# Patient Record
Sex: FEMALE | Race: WHITE | NOT HISPANIC OR LATINO | Employment: UNEMPLOYED | URBAN - METROPOLITAN AREA
[De-identification: names, ages, dates, MRNs, and addresses within clinical notes are randomized per-mention and may not be internally consistent; named-entity substitution may affect disease eponyms.]

---

## 2023-04-26 ENCOUNTER — OFFICE VISIT (OUTPATIENT)
Dept: PRIMARY CARE | Facility: CLINIC | Age: 78
End: 2023-04-26
Payer: MEDICARE

## 2023-04-26 ENCOUNTER — LAB (OUTPATIENT)
Dept: LAB | Facility: LAB | Age: 78
End: 2023-04-26
Payer: MEDICARE

## 2023-04-26 VITALS
DIASTOLIC BLOOD PRESSURE: 80 MMHG | SYSTOLIC BLOOD PRESSURE: 134 MMHG | HEIGHT: 55 IN | BODY MASS INDEX: 24.3 KG/M2 | TEMPERATURE: 96.4 F | HEART RATE: 76 BPM | WEIGHT: 105 LBS

## 2023-04-26 DIAGNOSIS — I47.10 PAROXYSMAL SVT (SUPRAVENTRICULAR TACHYCARDIA) (CMS-HCC): ICD-10-CM

## 2023-04-26 DIAGNOSIS — K76.6 PORTAL HYPERTENSION (MULTI): ICD-10-CM

## 2023-04-26 DIAGNOSIS — M46.46 DISCITIS OF LUMBAR REGION: ICD-10-CM

## 2023-04-26 DIAGNOSIS — M25.551 PAIN OF RIGHT HIP: ICD-10-CM

## 2023-04-26 DIAGNOSIS — K70.30 ALCOHOLIC CIRRHOSIS OF LIVER WITHOUT ASCITES (MULTI): Primary | ICD-10-CM

## 2023-04-26 DIAGNOSIS — K70.30 ALCOHOLIC CIRRHOSIS OF LIVER WITHOUT ASCITES (MULTI): ICD-10-CM

## 2023-04-26 DIAGNOSIS — M79.651 THIGH PAIN, MUSCULOSKELETAL, RIGHT: ICD-10-CM

## 2023-04-26 PROBLEM — D69.59 THROMBOCYTOPENIA DUE TO HYPERSPLENISM: Status: ACTIVE | Noted: 2023-04-26

## 2023-04-26 PROBLEM — I85.01 ESOPHAGEAL VARICES WITH BLEEDING (MULTI): Status: ACTIVE | Noted: 2023-04-26

## 2023-04-26 PROBLEM — K74.60 CIRRHOSIS (MULTI): Status: ACTIVE | Noted: 2023-04-26

## 2023-04-26 PROBLEM — D73.1 THROMBOCYTOPENIA DUE TO HYPERSPLENISM: Status: ACTIVE | Noted: 2023-04-26

## 2023-04-26 PROBLEM — K44.9 HIATAL HERNIA: Status: ACTIVE | Noted: 2023-04-26

## 2023-04-26 PROBLEM — D62 ANEMIA DUE TO ACUTE BLOOD LOSS: Status: ACTIVE | Noted: 2023-04-26

## 2023-04-26 LAB
BASOPHILS (10*3/UL) IN BLOOD BY AUTOMATED COUNT: 0.04 X10E9/L (ref 0–0.1)
BASOPHILS/100 LEUKOCYTES IN BLOOD BY AUTOMATED COUNT: 1 % (ref 0–2)
EOSINOPHILS (10*3/UL) IN BLOOD BY AUTOMATED COUNT: 0.01 X10E9/L (ref 0–0.4)
EOSINOPHILS/100 LEUKOCYTES IN BLOOD BY AUTOMATED COUNT: 0.3 % (ref 0–6)
ERYTHROCYTE DISTRIBUTION WIDTH (RATIO) BY AUTOMATED COUNT: 16.4 % (ref 11.5–14.5)
ERYTHROCYTE MEAN CORPUSCULAR HEMOGLOBIN CONCENTRATION (G/DL) BY AUTOMATED: 28.4 G/DL (ref 32–36)
ERYTHROCYTE MEAN CORPUSCULAR VOLUME (FL) BY AUTOMATED COUNT: 91 FL (ref 80–100)
ERYTHROCYTES (10*6/UL) IN BLOOD BY AUTOMATED COUNT: 3.64 X10E12/L (ref 4–5.2)
HEMATOCRIT (%) IN BLOOD BY AUTOMATED COUNT: 33.1 % (ref 36–46)
HEMOGLOBIN (G/DL) IN BLOOD: 9.4 G/DL (ref 12–16)
IMMATURE GRANULOCYTES/100 LEUKOCYTES IN BLOOD BY AUTOMATED COUNT: 0 % (ref 0–0.9)
LEUKOCYTES (10*3/UL) IN BLOOD BY AUTOMATED COUNT: 3.9 X10E9/L (ref 4.4–11.3)
LYMPHOCYTES (10*3/UL) IN BLOOD BY AUTOMATED COUNT: 0.34 X10E9/L (ref 0.8–3)
LYMPHOCYTES/100 LEUKOCYTES IN BLOOD BY AUTOMATED COUNT: 8.7 % (ref 13–44)
MONOCYTES (10*3/UL) IN BLOOD BY AUTOMATED COUNT: 0.33 X10E9/L (ref 0.05–0.8)
MONOCYTES/100 LEUKOCYTES IN BLOOD BY AUTOMATED COUNT: 8.4 % (ref 2–10)
NEUTROPHILS (10*3/UL) IN BLOOD BY AUTOMATED COUNT: 3.19 X10E9/L (ref 1.6–5.5)
NEUTROPHILS/100 LEUKOCYTES IN BLOOD BY AUTOMATED COUNT: 81.6 % (ref 40–80)
PLATELETS (10*3/UL) IN BLOOD AUTOMATED COUNT: 65 X10E9/L (ref 150–450)

## 2023-04-26 PROCEDURE — 1160F RVW MEDS BY RX/DR IN RCRD: CPT | Performed by: INTERNAL MEDICINE

## 2023-04-26 PROCEDURE — 99214 OFFICE O/P EST MOD 30 MIN: CPT | Performed by: INTERNAL MEDICINE

## 2023-04-26 PROCEDURE — 1036F TOBACCO NON-USER: CPT | Performed by: INTERNAL MEDICINE

## 2023-04-26 PROCEDURE — 36415 COLL VENOUS BLD VENIPUNCTURE: CPT

## 2023-04-26 PROCEDURE — 85025 COMPLETE CBC W/AUTO DIFF WBC: CPT

## 2023-04-26 PROCEDURE — 1159F MED LIST DOCD IN RCRD: CPT | Performed by: INTERNAL MEDICINE

## 2023-04-26 PROCEDURE — 1157F ADVNC CARE PLAN IN RCRD: CPT | Performed by: INTERNAL MEDICINE

## 2023-04-26 RX ORDER — NADOLOL 20 MG/1
1 TABLET ORAL NIGHTLY
COMMUNITY
Start: 2023-03-07 | End: 2023-09-27 | Stop reason: SDUPTHER

## 2023-04-26 RX ORDER — TRAMADOL HYDROCHLORIDE 50 MG/1
50 TABLET ORAL
Qty: 30 TABLET | Refills: 0 | Status: SHIPPED | OUTPATIENT
Start: 2023-04-26 | End: 2023-06-02 | Stop reason: SDUPTHER

## 2023-04-26 RX ORDER — LACTULOSE 10 G/15ML
10 SOLUTION ORAL; RECTAL 2 TIMES DAILY
Qty: 946 ML | Refills: 6 | Status: SHIPPED | OUTPATIENT
Start: 2023-04-26

## 2023-04-26 RX ORDER — TRAMADOL HYDROCHLORIDE 50 MG/1
TABLET ORAL
COMMUNITY
Start: 2022-08-17 | End: 2023-04-26 | Stop reason: SDUPTHER

## 2023-04-26 RX ORDER — LACTULOSE 10 G/15ML
SOLUTION ORAL; RECTAL
COMMUNITY
End: 2023-04-26 | Stop reason: SDUPTHER

## 2023-04-26 RX ORDER — SPIRONOLACTONE 50 MG/1
1 TABLET, FILM COATED ORAL DAILY
COMMUNITY
Start: 2015-12-31 | End: 2023-11-06 | Stop reason: SDUPTHER

## 2023-04-26 RX ORDER — FAMOTIDINE 20 MG/1
1 TABLET, FILM COATED ORAL 2 TIMES DAILY
COMMUNITY
Start: 2022-08-11

## 2023-04-26 RX ORDER — FUROSEMIDE 20 MG/1
1 TABLET ORAL DAILY
COMMUNITY
End: 2024-02-06 | Stop reason: SDUPTHER

## 2023-04-26 ASSESSMENT — PATIENT HEALTH QUESTIONNAIRE - PHQ9
SUM OF ALL RESPONSES TO PHQ9 QUESTIONS 1 AND 2: 0
2. FEELING DOWN, DEPRESSED OR HOPELESS: NOT AT ALL
1. LITTLE INTEREST OR PLEASURE IN DOING THINGS: NOT AT ALL

## 2023-04-26 ASSESSMENT — ENCOUNTER SYMPTOMS
CARDIOVASCULAR NEGATIVE: 1
ARTHRALGIAS: 1
DEPRESSION: 0
LOSS OF SENSATION IN FEET: 0
NUMBNESS: 1
CONSTITUTIONAL NEGATIVE: 1
LEG PAIN: 1
GASTROINTESTINAL NEGATIVE: 1
PARESTHESIAS: 1
RESPIRATORY NEGATIVE: 1
BACK PAIN: 1
OCCASIONAL FEELINGS OF UNSTEADINESS: 0
ABDOMINAL PAIN: 0
EYES NEGATIVE: 1

## 2023-04-26 NOTE — PROGRESS NOTES
"Subjective   Patient ID: Penelope A Leydig is a 77 y.o. female who presents for Follow-up (3 mo fu still having pain in rt leg also having knee pain).    Back Pain  This is a recurrent problem. The current episode started 1 to 4 weeks ago. The problem occurs constantly. The problem is unchanged. The pain is present in the lumbar spine and sacro-iliac. The quality of the pain is described as aching. The pain radiates to the right thigh. The pain is at a severity of 5/10. The pain is moderate. The pain is Worse during the day. Associated symptoms include leg pain, numbness and paresthesias. Pertinent negatives include no abdominal pain. The treatment provided mild relief.    Liver Dysfunction: Patient complains of an abnormal liver function tests within normal limits that has been associated with no obvious evidence for illness. The problem was first noted  several years ago. A positive history was noted for:  ETOH ,  cirrhosis , and exposure to  none . Patient denies none,  cirrhosis , or exposure to  ETOH .      Review of Systems   Constitutional: Negative.    HENT: Negative.     Eyes: Negative.    Respiratory: Negative.     Cardiovascular: Negative.    Gastrointestinal: Negative.  Negative for abdominal pain.   Musculoskeletal:  Positive for arthralgias and back pain.   Skin: Negative.    Neurological:  Positive for numbness and paresthesias.       Objective   /80 (BP Location: Left arm, Patient Position: Sitting, BP Cuff Size: Adult)   Pulse 76   Temp 35.8 °C (96.4 °F) (Temporal)   Ht 1.346 m (4' 5\")   Wt 47.6 kg (105 lb)   BMI 26.28 kg/m²     Physical Exam  Vitals reviewed.   Constitutional:       Appearance: Normal appearance. She is normal weight.   HENT:      Head: Normocephalic.   Eyes:      Conjunctiva/sclera: Conjunctivae normal.   Cardiovascular:      Rate and Rhythm: Normal rate and regular rhythm.   Pulmonary:      Effort: Pulmonary effort is normal.      Breath sounds: Normal breath sounds. "   Abdominal:      General: There is no distension.      Palpations: Abdomen is soft.   Musculoskeletal:         General: Tenderness and deformity present.      Cervical back: Normal range of motion.      Right lower leg: Edema present.      Left lower leg: Edema present.   Skin:     General: Skin is warm and dry.   Neurological:      General: No focal deficit present.   Psychiatric:         Mood and Affect: Mood normal.       Assessment/Plan   Problem List Items Addressed This Visit          Circulatory    Paroxysmal SVT (supraventricular tachycardia) (CMS/HCC)    Relevant Medications    nadolol (Corgard) 20 mg tablet    Portal hypertension (CMS/HCC)       Digestive    Cirrhosis (CMS/HCC) - Primary       Musculoskeletal    Pain of right hip    Thigh pain, musculoskeletal, right    Discitis of lumbar region   OARRS reviewed, UDS has been done on annual basis. Rx patten and usage reviewed, adverse reactions to controlled substance and risk of habituality and addiction and overdose and death explained, pt has been explained about [facility] controlled substance policy and mandates and follow ups. Controlled substance agreement has been reviewed and renewed annually. Please keep bottles containing controlled substance medications away from children and in safe area, do not share this Rx to anyone, noncompliance will lead to cessation of therapy. So far patient has been doing well and shows compliance.  Today patient was accompanied by her daughter, she has been having persistent pain and discomfort in the right hip, right femur or thigh and right knee region, seen by orthopedics, she has a previous subtrochanteric fracture with hardware, x-ray was reported that there could be a nonunion, I am concerned that this pain and discomfort is not going anywhere so would consider advanced imaging to see whether there is any delayed healing, there is a fracture residual, also on top of that she has a discitis and  osteomyelitis of lumbar spine, she has a pain and discomfort in the lower back region, we will do CT scan of lumbar spine to assess the discitis possibility recurrence.  She is immunocompromise, she has a cirrhosis of liver, recent hepatology follow-up was reviewed, she needs a hemoglobin, she has a chronic thrombocytopenia, she requires a rollator for mobility and ambulation, all the reports were reviewed, plan of care was reviewed with daughter, continue same medications, tramadol has been used infrequently but it has been given and narcotic contract was renewed.

## 2023-05-17 DIAGNOSIS — Q43.8: ICD-10-CM

## 2023-05-17 DIAGNOSIS — S72.001K CLOSED FRACTURE OF RIGHT HIP WITH NONUNION, SUBSEQUENT ENCOUNTER: ICD-10-CM

## 2023-05-17 DIAGNOSIS — Q87.89: ICD-10-CM

## 2023-05-17 DIAGNOSIS — K40.90 UNILATERAL INGUINAL HERNIA WITHOUT OBSTRUCTION OR GANGRENE, RECURRENCE NOT SPECIFIED: Primary | ICD-10-CM

## 2023-05-17 DIAGNOSIS — Q89.01: ICD-10-CM

## 2023-05-17 DIAGNOSIS — Z00.00 NO ABNORMALITY OF HIP JOINT DETECTED ON EXAMINATION: ICD-10-CM

## 2023-05-17 DIAGNOSIS — Q79.0: ICD-10-CM

## 2023-05-17 DIAGNOSIS — K70.30 ALCOHOLIC CIRRHOSIS OF LIVER WITHOUT ASCITES (MULTI): ICD-10-CM

## 2023-05-30 ENCOUNTER — TELEPHONE (OUTPATIENT)
Dept: PRIMARY CARE | Facility: CLINIC | Age: 78
End: 2023-05-30
Payer: MEDICARE

## 2023-05-30 NOTE — TELEPHONE ENCOUNTER
Willow would like to speak with you regarding pennys condition, please call her when you can she ask. ty

## 2023-05-31 DIAGNOSIS — K76.6 PORTAL HYPERTENSION (MULTI): ICD-10-CM

## 2023-06-02 DIAGNOSIS — M46.46 DISCITIS OF LUMBAR REGION: ICD-10-CM

## 2023-06-02 DIAGNOSIS — M79.651 THIGH PAIN, MUSCULOSKELETAL, RIGHT: ICD-10-CM

## 2023-06-02 DIAGNOSIS — M25.551 PAIN OF RIGHT HIP: ICD-10-CM

## 2023-06-02 RX ORDER — TRAMADOL HYDROCHLORIDE 50 MG/1
50 TABLET ORAL
Qty: 30 TABLET | Refills: 0 | Status: SHIPPED | OUTPATIENT
Start: 2023-06-02 | End: 2023-07-02

## 2023-07-19 DIAGNOSIS — M79.651 THIGH PAIN, MUSCULOSKELETAL, RIGHT: ICD-10-CM

## 2023-07-19 DIAGNOSIS — M25.551 PAIN OF RIGHT HIP: ICD-10-CM

## 2023-07-19 RX ORDER — TRAMADOL HYDROCHLORIDE 50 MG/1
50 TABLET ORAL DAILY PRN
Qty: 30 TABLET | Refills: 0 | Status: SHIPPED | OUTPATIENT
Start: 2023-07-19 | End: 2023-08-21 | Stop reason: SDUPTHER

## 2023-07-19 RX ORDER — TRAMADOL HYDROCHLORIDE 50 MG/1
50 TABLET ORAL DAILY PRN
COMMUNITY
Start: 2022-08-17 | End: 2023-07-19 | Stop reason: SDUPTHER

## 2023-07-26 ENCOUNTER — OFFICE VISIT (OUTPATIENT)
Dept: PRIMARY CARE | Facility: CLINIC | Age: 78
End: 2023-07-26
Payer: MEDICARE

## 2023-07-26 ENCOUNTER — LAB (OUTPATIENT)
Dept: LAB | Facility: LAB | Age: 78
End: 2023-07-26
Payer: MEDICARE

## 2023-07-26 VITALS
BODY MASS INDEX: 22.68 KG/M2 | SYSTOLIC BLOOD PRESSURE: 125 MMHG | DIASTOLIC BLOOD PRESSURE: 69 MMHG | HEART RATE: 90 BPM | HEIGHT: 55 IN | WEIGHT: 98 LBS | TEMPERATURE: 97.7 F

## 2023-07-26 DIAGNOSIS — K40.20 BILATERAL INGUINAL HERNIA WITHOUT OBSTRUCTION OR GANGRENE, RECURRENCE NOT SPECIFIED: ICD-10-CM

## 2023-07-26 DIAGNOSIS — I47.10 PAROXYSMAL SVT (SUPRAVENTRICULAR TACHYCARDIA) (CMS-HCC): Primary | ICD-10-CM

## 2023-07-26 DIAGNOSIS — K44.9 HIATAL HERNIA: ICD-10-CM

## 2023-07-26 DIAGNOSIS — D73.1 THROMBOCYTOPENIA DUE TO HYPERSPLENISM: ICD-10-CM

## 2023-07-26 DIAGNOSIS — D69.59 THROMBOCYTOPENIA DUE TO HYPERSPLENISM: ICD-10-CM

## 2023-07-26 DIAGNOSIS — K70.30 ALCOHOLIC CIRRHOSIS OF LIVER WITHOUT ASCITES (MULTI): ICD-10-CM

## 2023-07-26 DIAGNOSIS — K76.6 PORTAL HYPERTENSION (MULTI): ICD-10-CM

## 2023-07-26 DIAGNOSIS — S72.001K: ICD-10-CM

## 2023-07-26 LAB
BASOPHILS (10*3/UL) IN BLOOD BY AUTOMATED COUNT: 0.03 X10E9/L (ref 0–0.1)
BASOPHILS/100 LEUKOCYTES IN BLOOD BY AUTOMATED COUNT: 0.8 % (ref 0–2)
EOSINOPHILS (10*3/UL) IN BLOOD BY AUTOMATED COUNT: 0.04 X10E9/L (ref 0–0.4)
EOSINOPHILS/100 LEUKOCYTES IN BLOOD BY AUTOMATED COUNT: 1 % (ref 0–6)
ERYTHROCYTE DISTRIBUTION WIDTH (RATIO) BY AUTOMATED COUNT: 16.4 % (ref 11.5–14.5)
ERYTHROCYTE MEAN CORPUSCULAR HEMOGLOBIN CONCENTRATION (G/DL) BY AUTOMATED: 30.9 G/DL (ref 32–36)
ERYTHROCYTE MEAN CORPUSCULAR VOLUME (FL) BY AUTOMATED COUNT: 94 FL (ref 80–100)
ERYTHROCYTES (10*6/UL) IN BLOOD BY AUTOMATED COUNT: 3.62 X10E12/L (ref 4–5.2)
HEMATOCRIT (%) IN BLOOD BY AUTOMATED COUNT: 34 % (ref 36–46)
HEMOGLOBIN (G/DL) IN BLOOD: 10.5 G/DL (ref 12–16)
IMMATURE GRANULOCYTES/100 LEUKOCYTES IN BLOOD BY AUTOMATED COUNT: 0 % (ref 0–0.9)
LEUKOCYTES (10*3/UL) IN BLOOD BY AUTOMATED COUNT: 4 X10E9/L (ref 4.4–11.3)
LYMPHOCYTES (10*3/UL) IN BLOOD BY AUTOMATED COUNT: 0.33 X10E9/L (ref 0.8–3)
LYMPHOCYTES/100 LEUKOCYTES IN BLOOD BY AUTOMATED COUNT: 8.3 % (ref 13–44)
MONOCYTES (10*3/UL) IN BLOOD BY AUTOMATED COUNT: 0.42 X10E9/L (ref 0.05–0.8)
MONOCYTES/100 LEUKOCYTES IN BLOOD BY AUTOMATED COUNT: 10.6 % (ref 2–10)
NEUTROPHILS (10*3/UL) IN BLOOD BY AUTOMATED COUNT: 3.14 X10E9/L (ref 1.6–5.5)
NEUTROPHILS/100 LEUKOCYTES IN BLOOD BY AUTOMATED COUNT: 79.3 % (ref 40–80)
PLATELETS (10*3/UL) IN BLOOD AUTOMATED COUNT: 66 X10E9/L (ref 150–450)

## 2023-07-26 PROCEDURE — 36415 COLL VENOUS BLD VENIPUNCTURE: CPT

## 2023-07-26 PROCEDURE — 1160F RVW MEDS BY RX/DR IN RCRD: CPT | Performed by: INTERNAL MEDICINE

## 2023-07-26 PROCEDURE — 1159F MED LIST DOCD IN RCRD: CPT | Performed by: INTERNAL MEDICINE

## 2023-07-26 PROCEDURE — 1036F TOBACCO NON-USER: CPT | Performed by: INTERNAL MEDICINE

## 2023-07-26 PROCEDURE — 85025 COMPLETE CBC W/AUTO DIFF WBC: CPT

## 2023-07-26 PROCEDURE — 1157F ADVNC CARE PLAN IN RCRD: CPT | Performed by: INTERNAL MEDICINE

## 2023-07-26 PROCEDURE — 99213 OFFICE O/P EST LOW 20 MIN: CPT | Performed by: INTERNAL MEDICINE

## 2023-07-26 RX ORDER — PROPRANOLOL HYDROCHLORIDE 20 MG/1
20 TABLET ORAL DAILY PRN
COMMUNITY
End: 2023-10-25 | Stop reason: ALTCHOICE

## 2023-07-26 ASSESSMENT — ENCOUNTER SYMPTOMS
GASTROINTESTINAL NEGATIVE: 1
ABDOMINAL PAIN: 0
UNEXPECTED WEIGHT CHANGE: 0
BACK PAIN: 1
WEIGHT LOSS: 1
ARTHRALGIAS: 1
NEUROLOGICAL NEGATIVE: 1
RESPIRATORY NEGATIVE: 1
EYES NEGATIVE: 1
LIGHT-HEADEDNESS: 0
PALPITATIONS: 0
CARDIOVASCULAR NEGATIVE: 1
ANOREXIA: 0
BRUISES/BLEEDS EASILY: 1

## 2023-08-21 DIAGNOSIS — M79.651 THIGH PAIN, MUSCULOSKELETAL, RIGHT: ICD-10-CM

## 2023-08-21 DIAGNOSIS — M25.551 PAIN OF RIGHT HIP: ICD-10-CM

## 2023-08-21 RX ORDER — TRAMADOL HYDROCHLORIDE 50 MG/1
50 TABLET ORAL DAILY PRN
Qty: 30 TABLET | Refills: 0 | Status: SHIPPED | OUTPATIENT
Start: 2023-08-21 | End: 2023-09-27 | Stop reason: SDUPTHER

## 2023-09-27 DIAGNOSIS — M79.651 THIGH PAIN, MUSCULOSKELETAL, RIGHT: ICD-10-CM

## 2023-09-27 DIAGNOSIS — K70.30 ALCOHOLIC CIRRHOSIS OF LIVER WITHOUT ASCITES (MULTI): Primary | ICD-10-CM

## 2023-09-27 DIAGNOSIS — M25.551 PAIN OF RIGHT HIP: ICD-10-CM

## 2023-09-27 DIAGNOSIS — K70.30 ALCOHOLIC CIRRHOSIS OF LIVER WITHOUT ASCITES (MULTI): ICD-10-CM

## 2023-09-27 RX ORDER — NADOLOL 20 MG/1
20 TABLET ORAL NIGHTLY
Qty: 30 TABLET | Refills: 11 | Status: SHIPPED | OUTPATIENT
Start: 2023-09-27 | End: 2023-09-27 | Stop reason: SDUPTHER

## 2023-09-27 RX ORDER — NADOLOL 20 MG/1
20 TABLET ORAL NIGHTLY
Qty: 90 TABLET | Refills: 3 | Status: SHIPPED | OUTPATIENT
Start: 2023-09-27 | End: 2023-11-08 | Stop reason: ALTCHOICE

## 2023-09-27 RX ORDER — TRAMADOL HYDROCHLORIDE 50 MG/1
50 TABLET ORAL DAILY PRN
Qty: 30 TABLET | Refills: 1 | Status: SHIPPED | OUTPATIENT
Start: 2023-09-27 | End: 2023-10-19 | Stop reason: SDUPTHER

## 2023-10-19 DIAGNOSIS — M25.551 PAIN OF RIGHT HIP: ICD-10-CM

## 2023-10-19 DIAGNOSIS — M79.651 THIGH PAIN, MUSCULOSKELETAL, RIGHT: ICD-10-CM

## 2023-10-19 RX ORDER — TRAMADOL HYDROCHLORIDE 50 MG/1
50 TABLET ORAL DAILY PRN
Qty: 30 TABLET | Refills: 1 | Status: SHIPPED | OUTPATIENT
Start: 2023-10-19 | End: 2023-11-21 | Stop reason: SDUPTHER

## 2023-10-24 ENCOUNTER — HOSPITAL ENCOUNTER (EMERGENCY)
Facility: HOSPITAL | Age: 78
Discharge: HOME | End: 2023-10-25
Attending: EMERGENCY MEDICINE
Payer: MEDICARE

## 2023-10-24 ENCOUNTER — APPOINTMENT (OUTPATIENT)
Dept: RADIOLOGY | Facility: HOSPITAL | Age: 78
End: 2023-10-24
Payer: MEDICARE

## 2023-10-24 DIAGNOSIS — S01.01XA SCALP LACERATION, INITIAL ENCOUNTER: ICD-10-CM
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
## 2023-10-24 PROCEDURE — 70450 CT HEAD/BRAIN W/O DYE: CPT | Performed by: RADIOLOGY

## 2023-10-24 PROCEDURE — 99284 EMERGENCY DEPT VISIT MOD MDM: CPT | Mod: 25 | Performed by: EMERGENCY MEDICINE

## 2023-10-24 PROCEDURE — 70450 CT HEAD/BRAIN W/O DYE: CPT | Mod: MG

## 2023-10-24 ASSESSMENT — LIFESTYLE VARIABLES
EVER HAD A DRINK FIRST THING IN THE MORNING TO STEADY YOUR NERVES TO GET RID OF A HANGOVER: NO
HAVE PEOPLE ANNOYED YOU BY CRITICIZING YOUR DRINKING: NO
EVER FELT BAD OR GUILTY ABOUT YOUR DRINKING: NO
HAVE YOU EVER FELT YOU SHOULD CUT DOWN ON YOUR DRINKING: NO
REASON UNABLE TO ASSESS: NO

## 2023-10-24 ASSESSMENT — COLUMBIA-SUICIDE SEVERITY RATING SCALE - C-SSRS
6. HAVE YOU EVER DONE ANYTHING, STARTED TO DO ANYTHING, OR PREPARED TO DO ANYTHING TO END YOUR LIFE?: NO
1. IN THE PAST MONTH, HAVE YOU WISHED YOU WERE DEAD OR WISHED YOU COULD GO TO SLEEP AND NOT WAKE UP?: NO
1. IN THE PAST MONTH, HAVE YOU WISHED YOU WERE DEAD OR WISHED YOU COULD GO TO SLEEP AND NOT WAKE UP?: NO
6. HAVE YOU EVER DONE ANYTHING, STARTED TO DO ANYTHING, OR PREPARED TO DO ANYTHING TO END YOUR LIFE?: NO
2. HAVE YOU ACTUALLY HAD ANY THOUGHTS OF KILLING YOURSELF?: NO
2. HAVE YOU ACTUALLY HAD ANY THOUGHTS OF KILLING YOURSELF?: NO

## 2023-10-24 ASSESSMENT — PAIN SCALES - GENERAL: PAINLEVEL_OUTOF10: 10 - WORST POSSIBLE PAIN

## 2023-10-24 ASSESSMENT — PAIN - FUNCTIONAL ASSESSMENT: PAIN_FUNCTIONAL_ASSESSMENT: 0-10

## 2023-10-25 ENCOUNTER — OFFICE VISIT (OUTPATIENT)
Dept: PRIMARY CARE | Facility: CLINIC | Age: 78
End: 2023-10-25
Payer: MEDICARE

## 2023-10-25 ENCOUNTER — LAB (OUTPATIENT)
Dept: LAB | Facility: LAB | Age: 78
End: 2023-10-25
Payer: MEDICARE

## 2023-10-25 VITALS
HEART RATE: 65 BPM | RESPIRATION RATE: 16 BRPM | DIASTOLIC BLOOD PRESSURE: 61 MMHG | SYSTOLIC BLOOD PRESSURE: 134 MMHG | OXYGEN SATURATION: 97 %

## 2023-10-25 VITALS
SYSTOLIC BLOOD PRESSURE: 126 MMHG | HEIGHT: 55 IN | WEIGHT: 98 LBS | DIASTOLIC BLOOD PRESSURE: 68 MMHG | TEMPERATURE: 97.1 F | HEART RATE: 62 BPM | BODY MASS INDEX: 22.68 KG/M2

## 2023-10-25 DIAGNOSIS — Q87.89: ICD-10-CM

## 2023-10-25 DIAGNOSIS — Q89.01: ICD-10-CM

## 2023-10-25 DIAGNOSIS — D73.1 THROMBOCYTOPENIA DUE TO HYPERSPLENISM: ICD-10-CM

## 2023-10-25 DIAGNOSIS — D69.59 THROMBOCYTOPENIA DUE TO HYPERSPLENISM: ICD-10-CM

## 2023-10-25 DIAGNOSIS — Q43.8: ICD-10-CM

## 2023-10-25 DIAGNOSIS — K44.9 HIATAL HERNIA: ICD-10-CM

## 2023-10-25 DIAGNOSIS — Q79.0: ICD-10-CM

## 2023-10-25 DIAGNOSIS — K70.30 ALCOHOLIC CIRRHOSIS OF LIVER WITHOUT ASCITES (MULTI): ICD-10-CM

## 2023-10-25 DIAGNOSIS — S09.90XD CLOSED HEAD INJURY, SUBSEQUENT ENCOUNTER: ICD-10-CM

## 2023-10-25 DIAGNOSIS — I47.10 PAROXYSMAL SVT (SUPRAVENTRICULAR TACHYCARDIA) (CMS-HCC): ICD-10-CM

## 2023-10-25 DIAGNOSIS — Z00.00 NO ABNORMALITY OF HIP JOINT DETECTED ON EXAMINATION: ICD-10-CM

## 2023-10-25 DIAGNOSIS — M05.761 RHEUMATOID ARTHRITIS INVOLVING RIGHT KNEE WITH POSITIVE RHEUMATOID FACTOR (MULTI): Primary | ICD-10-CM

## 2023-10-25 LAB
ALBUMIN SERPL BCP-MCNC: 3.6 G/DL (ref 3.4–5)
ALP SERPL-CCNC: 88 U/L (ref 33–136)
ALT SERPL W P-5'-P-CCNC: 10 U/L (ref 7–45)
ANION GAP SERPL CALC-SCNC: 12 MMOL/L (ref 10–20)
AST SERPL W P-5'-P-CCNC: 22 U/L (ref 9–39)
BASOPHILS # BLD AUTO: 0.05 X10*3/UL (ref 0–0.1)
BASOPHILS NFR BLD AUTO: 0.7 %
BILIRUB SERPL-MCNC: 2 MG/DL (ref 0–1.2)
BUN SERPL-MCNC: 21 MG/DL (ref 6–23)
CALCIUM SERPL-MCNC: 9.2 MG/DL (ref 8.6–10.3)
CHLORIDE SERPL-SCNC: 103 MMOL/L (ref 98–107)
CO2 SERPL-SCNC: 26 MMOL/L (ref 21–32)
CREAT SERPL-MCNC: 0.7 MG/DL (ref 0.5–1.05)
EOSINOPHIL # BLD AUTO: 0.04 X10*3/UL (ref 0–0.4)
EOSINOPHIL NFR BLD AUTO: 0.6 %
ERYTHROCYTE [DISTWIDTH] IN BLOOD BY AUTOMATED COUNT: 16.5 % (ref 11.5–14.5)
GFR SERPL CREATININE-BSD FRML MDRD: 89 ML/MIN/1.73M*2
GLUCOSE SERPL-MCNC: 90 MG/DL (ref 74–99)
HCT VFR BLD AUTO: 35.7 % (ref 36–46)
HGB BLD-MCNC: 11.4 G/DL (ref 12–16)
IMM GRANULOCYTES # BLD AUTO: 0.02 X10*3/UL (ref 0–0.5)
IMM GRANULOCYTES NFR BLD AUTO: 0.3 % (ref 0–0.9)
LYMPHOCYTES # BLD AUTO: 0.6 X10*3/UL (ref 0.8–3)
LYMPHOCYTES NFR BLD AUTO: 8.9 %
MCH RBC QN AUTO: 31.2 PG (ref 26–34)
MCHC RBC AUTO-ENTMCNC: 31.9 G/DL (ref 32–36)
MCV RBC AUTO: 98 FL (ref 80–100)
MONOCYTES # BLD AUTO: 0.83 X10*3/UL (ref 0.05–0.8)
MONOCYTES NFR BLD AUTO: 12.3 %
NEUTROPHILS # BLD AUTO: 5.22 X10*3/UL (ref 1.6–5.5)
NEUTROPHILS NFR BLD AUTO: 77.2 %
NRBC BLD-RTO: 0 /100 WBCS (ref 0–0)
PLATELET # BLD AUTO: 124 X10*3/UL (ref 150–450)
PMV BLD AUTO: 10 FL (ref 7.5–11.5)
POTASSIUM SERPL-SCNC: 4.8 MMOL/L (ref 3.5–5.3)
PROT SERPL-MCNC: 6.2 G/DL (ref 6.4–8.2)
RBC # BLD AUTO: 3.65 X10*6/UL (ref 4–5.2)
SODIUM SERPL-SCNC: 136 MMOL/L (ref 136–145)
WBC # BLD AUTO: 6.8 X10*3/UL (ref 4.4–11.3)

## 2023-10-25 PROCEDURE — 99213 OFFICE O/P EST LOW 20 MIN: CPT | Performed by: INTERNAL MEDICINE

## 2023-10-25 PROCEDURE — 90715 TDAP VACCINE 7 YRS/> IM: CPT

## 2023-10-25 PROCEDURE — 90471 IMMUNIZATION ADMIN: CPT

## 2023-10-25 PROCEDURE — 1036F TOBACCO NON-USER: CPT | Performed by: INTERNAL MEDICINE

## 2023-10-25 PROCEDURE — 1159F MED LIST DOCD IN RCRD: CPT | Performed by: INTERNAL MEDICINE

## 2023-10-25 PROCEDURE — 12001 RPR S/N/AX/GEN/TRNK 2.5CM/<: CPT

## 2023-10-25 PROCEDURE — 36415 COLL VENOUS BLD VENIPUNCTURE: CPT

## 2023-10-25 PROCEDURE — 80053 COMPREHEN METABOLIC PANEL: CPT

## 2023-10-25 PROCEDURE — 1125F AMNT PAIN NOTED PAIN PRSNT: CPT | Performed by: INTERNAL MEDICINE

## 2023-10-25 PROCEDURE — 82105 ALPHA-FETOPROTEIN SERUM: CPT

## 2023-10-25 PROCEDURE — 85025 COMPLETE CBC W/AUTO DIFF WBC: CPT

## 2023-10-25 PROCEDURE — 2500000004 HC RX 250 GENERAL PHARMACY W/ HCPCS (ALT 636 FOR OP/ED)

## 2023-10-25 PROCEDURE — 1160F RVW MEDS BY RX/DR IN RCRD: CPT | Performed by: INTERNAL MEDICINE

## 2023-10-25 RX ADMIN — TETANUS TOXOID, REDUCED DIPHTHERIA TOXOID AND ACELLULAR PERTUSSIS VACCINE, ADSORBED 0.5 ML: 5; 2.5; 8; 8; 2.5 SUSPENSION INTRAMUSCULAR at 00:54

## 2023-10-25 ASSESSMENT — ENCOUNTER SYMPTOMS
DYSURIA: 0
CARDIOVASCULAR NEGATIVE: 1
BRUISES/BLEEDS EASILY: 1
DISORIENTATION: 0
STIFFNESS: 1
DIARRHEA: 0
DIZZINESS: 0
EYES NEGATIVE: 1
VOMITING: 0
HEADACHES: 0
GASTROINTESTINAL NEGATIVE: 1
BLURRED VISION: 0
CONSTITUTIONAL NEGATIVE: 1
RESPIRATORY NEGATIVE: 1
JOINT SWELLING: 1

## 2023-10-25 NOTE — DISCHARGE INSTRUCTIONS
Please visit closest urgent care, ED, or wound clinic to have staples removed in 10 to 14 days.  Return to closest ED if you develop any severe headache, changes in vision, fever, chills, chest pain, shortness of breath, weakness.

## 2023-10-25 NOTE — PROGRESS NOTES
Subjective   Patient ID: Penelope A Leydig is a 78 y.o. female who presents for Follow-up (3 mo fu and hosp fu).    Head Injury   The incident occurred 6 to 12 hours ago. There was no loss of consciousness. The pain is at a severity of 0/10. The patient is experiencing no pain. Pertinent negatives include no blurred vision, disorientation, headaches or vomiting. The treatment provided significant relief.   Arthritis  Presents for follow-up visit. She complains of pain, stiffness and joint swelling. The symptoms have been stable. Affected locations include the left PIP, right DIP, right PIP, left MCP, right wrist and left DIP. Pertinent negatives include no diarrhea, dry eyes, dry mouth or dysuria.      Anemia  Presents for follow-up visit. Symptoms include bruises/bleeds easily and weight loss. There has been no abdominal pain, anorexia, light-headedness or palpitations. Signs of blood loss that are not present include melena. There are no compliance problems.  Side effects of medications include fatigue.    Liver Dysfunction: Patient complains of an abnormal liver function tests within normal limits that has been associated with no obvious evidence for illness. The problem was first noted  7  YEARS ago. A positive history was noted for: CIRRHOSIS, etoh, and exposure to ethanol. Patient denies none, etoh, or exposure to ethanol.      Review of Systems   Constitutional: Negative.    HENT: Negative.     Eyes: Negative.  Negative for blurred vision.   Respiratory: Negative.     Cardiovascular: Negative.    Gastrointestinal: Negative.  Negative for diarrhea and vomiting.   Genitourinary:  Negative for dysuria.   Musculoskeletal:  Positive for arthritis, gait problem, joint swelling and stiffness.   Skin: Negative.    Neurological:  Negative for dizziness and headaches.   Hematological:  Bruises/bleeds easily.       Objective   /68 (BP Location: Left arm, Patient Position: Sitting, BP Cuff Size: Adult)   Pulse 62   " Temp 36.2 °C (97.1 °F) (Temporal)   Ht 1.321 m (4' 4\")   Wt (!) 44.5 kg (98 lb)   BMI 25.48 kg/m²     Physical Exam  Vitals reviewed.   Constitutional:       Appearance: She is normal weight.   HENT:      Head: Normocephalic. Trauma yesterday with staples  Eyes:      Conjunctiva/sclera: Conjunctivae normal.   Cardiovascular:      Rate and Rhythm: Normal rate and regular rhythm.      Pulses: Normal pulses.   Pulmonary:      Effort: Pulmonary effort is normal.      Breath sounds: Normal breath sounds.   Abdominal:      Palpations: Abdomen is soft.      Hernia: A hernia is present.   Musculoskeletal:         General: Swelling and tenderness present.      Cervical back: Normal range of motion.      Right lower leg: Edema present.      Left lower leg: Edema present.   Skin:     General: Skin is warm and dry.   Neurological:      General: No focal deficit present.   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) - Primary M05.761     Other Visit Diagnoses         Codes    Closed head injury, subsequent encounter     S09.90XD        Yesterday she fell down and she has head injury, 7 staples were taken, CT was negative, she has a thrombocytopenia, today she was accompanied by her  and her daughter, she is wheelchair-bound, she is a tiny thin female patient with advanced deformed rheumatoid arthritis not treated with any specific therapy and cirrhosis of liver, last ultrasound showed a cirrhotic morphology of the liver, there is no evidence of hepatocellular carcinoma, as far as close head injury is concerned she needs to have observation or watchful for her symptoms of lethargy, nausea, vomiting, headache because slow and progressive " subdural collection of blood can happen because of number cytopenia and family was made aware, she is dragging her right lower extremity because of nonhealed fracture of right femur, she will require hemoglobin and platelet count assessment and alpha-fetoprotein today.  It is alcoholic cirrhosis but she has not been drinking for more than 5 years, she has a huge hiatus hernia it has been left alone, she remains on tramadol treatment it is a once a day as needed, today I did not do HPI format, tramadol prescription has been given by me, family is aware about tramadol usage, risks of therapy were reviewed diuretics to be continued, beta-blockers to be continued, no SVT, follow-up in 3 months.

## 2023-10-25 NOTE — ED PROVIDER NOTES
HPI   Chief Complaint   Patient presents with    Fall    Laceration     To top of head after mechanical fall, no LOC       Patient is a 78-year-old female presenting to [facility] ED for mechanical fall and head injury.  Patient family reports that earlier today that she tripped and struck her head against a piece of wooden furniture.  Patient denies any loss of consciousness or use of blood thinners.  Patient denies any acute chest pain, shortness of breath, nausea, vomiting, diarrhea, acute vision change.                          Genie Coma Scale Score: 15                  Patient History   History reviewed. No pertinent past medical history.  Past Surgical History:   Procedure Laterality Date    CT GUIDED PERCUTANEOUS BIOPSY BONE DEEP  2022    CT GUIDED PERCUTANEOUS BIOPSY BONE DEEP 2022 [facility]    CT GUIDED PERCUTANEOUS BIOPSY BONE DEEP  2022    CT GUIDED PERCUTANEOUS BIOPSY BONE DEEP 2022 [facility] CLINICAL LEGAC[facility]    OTHER SURGICAL HISTORY  2019    Hip fracture repair     No family history on file.  Social History     Tobacco Use    Smoking status: Former     Types: Cigarettes     Quit date: 1980     Years since quittin.8    Smokeless tobacco: Never   Vaping Use    Vaping Use: Never used   Substance Use Topics    Alcohol use: Not Currently    Drug use: Never       Physical Exam   ED Triage Vitals [10/24/23 2329]   Temp Heart Rate Resp BP   -- 67 18 137/62      SpO2 Temp src Heart Rate Source Patient Position   98 % -- -- --      BP Location FiO2 (%)     -- --       Physical Exam  Constitutional:       Appearance: Normal appearance. She is normal weight.   HENT:      Head: Normocephalic.      Comments: 2 cm curvilinear laceration to the central superior scalp.     Nose: Nose normal.      Mouth/Throat:      Mouth: Mucous membranes are moist.      Pharynx: Oropharynx is clear.   Eyes:      Extraocular Movements: Extraocular movements intact.      Conjunctiva/sclera:  Conjunctivae normal.      Pupils: Pupils are equal, round, and reactive to light.   Cardiovascular:      Rate and Rhythm: Normal rate and regular rhythm.      Pulses: Normal pulses.      Heart sounds: Normal heart sounds.   Pulmonary:      Effort: Pulmonary effort is normal.      Breath sounds: Normal breath sounds.   Abdominal:      General: Abdomen is flat. Bowel sounds are normal.      Palpations: Abdomen is soft.   Musculoskeletal:         General: Normal range of motion.      Cervical back: Normal range of motion and neck supple.   Skin:     General: Skin is warm and dry.      Capillary Refill: Capillary refill takes less than 2 seconds.   Neurological:      General: No focal deficit present.      Mental Status: She is alert and oriented to person, place, and time. Mental status is at baseline.   Psychiatric:         Mood and Affect: Mood normal.         Behavior: Behavior normal.         ED Course & MDM   Diagnoses as of 10/25/23 0403   Fall, initial encounter   Scalp laceration, initial encounter       Medical Decision Making  Patient is a 78-year-old female presenting to ED with mechanical fall and secondary head laceration injury.  Initial ED evaluation, patient noted to be resting comfortably in bed.  2 cm laceration to the superior scalp with epidermal and dermal involvement.  Laceration was cleaned, sterilized and was repaired with 6 staples.  Patient does not recall last tetanus booster shot.  Updated booster vaccine will be administered.  Patient to have staples removed in 10 to 14 days.  Anticipatory guidance and return precautions provided.  Patient otherwise stable for discharge.        Procedure  Procedures     Sumaya Beckett MD  Resident  10/25/23 0030  The patient was seen by the resident/fellow.  I have personally performed a substantive portion of the encounter.  I have seen and examined the patient; agree with the workup, evaluation, MDM, management and diagnosis.  The care plan has been  discussed with the resident/fellow; I have reviewed the resident/fellow’s note and agree with the documented findings with the exception/addition of the following:    The patient was instructed return to the emergency room for any worsening headache, nausea and vomiting, dizziness, altered mental status or confusion or any further falls otherwise staples must be removed in 7 to 10 days.       Walter Grubbs,   10/25/23 0410

## 2023-10-25 NOTE — ED PROCEDURE NOTE
Procedure  Laceration Repair    Performed by: Sumaya Beckett MD  Authorized by: Walter Grubbs DO    Consent:     Consent obtained:  Verbal    Consent given by:  Patient    Risks, benefits, and alternatives were discussed: yes      Risks discussed:  Infection and pain    Alternatives discussed:  No treatment  Universal protocol:     Procedure explained and questions answered to patient or proxy's satisfaction: yes      Relevant documents present and verified: yes      Imaging studies available: yes      Patient identity confirmed:  Verbally with patient and arm band  Anesthesia:     Anesthesia method:  None  Laceration details:     Location:  Scalp    Scalp location:  Mid-scalp    Length (cm):  2    Depth (mm):  2  Exploration:     Wound extent: no foreign bodies/material noted, no muscle damage noted, no nerve damage noted, no tendon damage noted, no underlying fracture noted and no vascular damage noted      Contaminated: no    Treatment:     Area cleansed with:  Saline    Amount of cleaning:  Standard    Irrigation solution:  Sterile saline    Irrigation method:  Tap    Debridement:  None    Undermining:  None    Scar revision: no    Skin repair:     Repair method:  Staples    Number of staples:  6  Approximation:     Approximation:  Close  Repair type:     Repair type:  Simple  Post-procedure details:     Dressing:  Open (no dressing)    Procedure completion:  Tolerated well, no immediate complications      Sumaya Beckett MD  Resident  10/25/23 0046    The patient was seen by the resident/fellow.  I have personally performed a substantive portion of the encounter.  I have seen and examined the patient; agree with the workup, evaluation, MDM, management and diagnosis.  The care plan has been discussed with the resident; I have reviewed the resident’s note and agree with the documented findings.         Walter Grubbs DO  10/25/23 4895

## 2023-10-26 LAB — AFP SERPL-MCNC: <4 NG/ML (ref 0–9)

## 2023-11-06 ENCOUNTER — OFFICE VISIT (OUTPATIENT)
Dept: PRIMARY CARE | Facility: CLINIC | Age: 78
End: 2023-11-06
Payer: MEDICARE

## 2023-11-06 VITALS
WEIGHT: 98 LBS | TEMPERATURE: 96.8 F | SYSTOLIC BLOOD PRESSURE: 120 MMHG | DIASTOLIC BLOOD PRESSURE: 68 MMHG | HEIGHT: 55 IN | HEART RATE: 67 BPM | BODY MASS INDEX: 22.68 KG/M2

## 2023-11-06 DIAGNOSIS — Z23 NEED FOR INFLUENZA VACCINATION: ICD-10-CM

## 2023-11-06 DIAGNOSIS — Z48.02 ENCOUNTER FOR STAPLE REMOVAL: Primary | ICD-10-CM

## 2023-11-06 DIAGNOSIS — I47.10 PAROXYSMAL SVT (SUPRAVENTRICULAR TACHYCARDIA) (CMS-HCC): ICD-10-CM

## 2023-11-06 PROCEDURE — 1036F TOBACCO NON-USER: CPT | Performed by: INTERNAL MEDICINE

## 2023-11-06 PROCEDURE — 90662 IIV NO PRSV INCREASED AG IM: CPT | Performed by: INTERNAL MEDICINE

## 2023-11-06 PROCEDURE — 1160F RVW MEDS BY RX/DR IN RCRD: CPT | Performed by: INTERNAL MEDICINE

## 2023-11-06 PROCEDURE — 1125F AMNT PAIN NOTED PAIN PRSNT: CPT | Performed by: INTERNAL MEDICINE

## 2023-11-06 PROCEDURE — 99213 OFFICE O/P EST LOW 20 MIN: CPT | Performed by: INTERNAL MEDICINE

## 2023-11-06 PROCEDURE — G0008 ADMIN INFLUENZA VIRUS VAC: HCPCS | Performed by: INTERNAL MEDICINE

## 2023-11-06 PROCEDURE — 1159F MED LIST DOCD IN RCRD: CPT | Performed by: INTERNAL MEDICINE

## 2023-11-06 RX ORDER — SPIRONOLACTONE 50 MG/1
50 TABLET, FILM COATED ORAL DAILY
Qty: 90 TABLET | Refills: 3 | Status: SHIPPED | OUTPATIENT
Start: 2023-11-06 | End: 2024-05-28 | Stop reason: DRUGHIGH

## 2023-11-06 ASSESSMENT — ENCOUNTER SYMPTOMS
HEADACHES: 0
CONFUSION: 0
RESPIRATORY NEGATIVE: 1
NUMBNESS: 0
CARDIOVASCULAR NEGATIVE: 1
VOMITING: 0
GASTROINTESTINAL NEGATIVE: 1
DISORIENTATION: 0
WEAKNESS: 0
NEUROLOGICAL NEGATIVE: 1
MEMORY LOSS: 0
CONSTITUTIONAL NEGATIVE: 1
BLURRED VISION: 0
EYES NEGATIVE: 1
MUSCULOSKELETAL NEGATIVE: 1

## 2023-11-06 NOTE — PROGRESS NOTES
"Subjective   Patient ID: Penelope A Leydig is a 78 y.o. female who presents for Follow-up (Fu for staple removal ).    Head Injury   The incident occurred more than 1 week ago. The injury mechanism was a fall. There was no loss of consciousness. There was no blood loss. The pain is at a severity of 0/10. The patient is experiencing no pain. Pertinent negatives include no blurred vision, disorientation, headaches, memory loss, numbness, tinnitus, vomiting or weakness. The treatment provided significant relief.        Review of Systems   Constitutional: Negative.    HENT: Negative.  Negative for tinnitus.    Eyes: Negative.  Negative for blurred vision.   Respiratory: Negative.     Cardiovascular: Negative.    Gastrointestinal: Negative.  Negative for vomiting.   Musculoskeletal: Negative.    Skin: Negative.    Neurological: Negative.  Negative for weakness, numbness and headaches.   Psychiatric/Behavioral:  Negative for confusion and memory loss.        Objective   /68 (BP Location: Left arm, Patient Position: Sitting, BP Cuff Size: Adult)   Pulse 67   Temp 36 °C (96.8 °F) (Temporal)   Ht 1.321 m (4' 4\")   Wt (!) 44.5 kg (98 lb)   BMI 25.48 kg/m²     Physical Exam  Vitals reviewed.   Constitutional:       Appearance: She is normal weight.   HENT:      Head: Normocephalic. Trauma yesterday with staples  Eyes:      Conjunctiva/sclera: Conjunctivae normal.   Cardiovascular:      Rate and Rhythm: Normal rate and regular rhythm.      Pulses: Normal pulses.   Pulmonary:      Effort: Pulmonary effort is normal.      Breath sounds: Normal breath sounds.   Abdominal:      Palpations: Abdomen is soft.      Hernia: A hernia is present.   Musculoskeletal:         General: Swelling and tenderness present.      Cervical back: Normal range of motion.      Right lower leg: Edema present.      Left lower leg: Edema present.   Skin:     General: Skin is warm and dry. Staples 6 in no and removed.  Neurological:      General: " No focal deficit present.     Assessment/Plan   Problem List Items Addressed This Visit             ICD-10-CM    Paroxysmal SVT (supraventricular tachycardia) I47.10     Other Visit Diagnoses         Codes    Encounter for staple removal    -  Primary Z48.02        6 staples were removed, checked properly and no residual staples left, wound is healing well, no SVT recurrence, influenza vaccine was given, last times laboratories were reviewed, hemoglobin and platelet counts are much better.  She did not fall again, Aldactone to be continued, tramadol has been used intermittently, make sure we do everything to prevent any falls I told patient and patient's spouse who were available.  She will keep her next appointment in 3 months.        Chonodrocutaneous Helical Advancement Flap Text: The defect edges were debeveled with a #15 scalpel blade. Given the location of the defect and the proximity to free margins a chondrocutaneous helical advancement flap was deemed most appropriate. Using a sterile surgical marker, the appropriate advancement flap was drawn incorporating the defect and placing the expected incisions within the relaxed skin tension lines where possible. The area thus outlined was incised deep to adipose tissue with a #15 scalpel blade. The skin margins were undermined to an appropriate distance in all directions utilizing iris scissors. Following this, the designed flap was advanced and carried over into the primary defect and sutured into place.

## 2023-11-07 PROBLEM — I25.10 CAD (CORONARY ARTERY DISEASE): Status: ACTIVE | Noted: 2023-11-07

## 2023-11-07 PROBLEM — K92.2 UPPER GI BLEEDING: Status: ACTIVE | Noted: 2023-11-07

## 2023-11-07 PROBLEM — B35.1 ONYCHOMYCOSIS: Status: ACTIVE | Noted: 2023-11-07

## 2023-11-07 PROBLEM — M19.90 DJD (DEGENERATIVE JOINT DISEASE): Status: ACTIVE | Noted: 2023-11-07

## 2023-11-07 PROBLEM — L84 CALLOSITY: Status: ACTIVE | Noted: 2017-09-27

## 2023-11-07 PROBLEM — M81.0 OSTEOPOROSIS: Status: ACTIVE | Noted: 2023-11-07

## 2023-11-07 PROBLEM — E78.00 HYPERCHOLESTEROLEMIA: Status: ACTIVE | Noted: 2023-11-07

## 2023-11-07 PROBLEM — M19.90 OA (OSTEOARTHRITIS): Status: ACTIVE | Noted: 2023-11-07

## 2023-11-07 PROBLEM — M46.25: Status: ACTIVE | Noted: 2022-07-24

## 2023-11-07 PROBLEM — F80.2 MIXED RECEPTIVE-EXPRESSIVE LANGUAGE DISORDER: Status: ACTIVE | Noted: 2022-07-24

## 2023-11-07 PROBLEM — M79.674 PAIN IN TOES OF BOTH FEET: Status: ACTIVE | Noted: 2023-07-24

## 2023-11-07 PROBLEM — R60.0 EDEMA OF LOWER EXTREMITY: Status: ACTIVE | Noted: 2017-07-26

## 2023-11-07 PROBLEM — M20.10 HALLUX VALGUS: Status: ACTIVE | Noted: 2023-11-07

## 2023-11-07 PROBLEM — F32.A DEPRESSION: Status: ACTIVE | Noted: 2023-11-07

## 2023-11-07 PROBLEM — M79.675 PAIN IN TOES OF BOTH FEET: Status: ACTIVE | Noted: 2023-07-24

## 2023-11-07 PROBLEM — I73.9 PERIPHERAL VASCULAR DISEASE (CMS-HCC): Status: ACTIVE | Noted: 2023-11-07

## 2023-11-07 PROBLEM — I10 HYPERTENSION: Status: ACTIVE | Noted: 2023-11-07

## 2023-11-07 PROBLEM — D50.9 IRON DEFICIENCY ANEMIA: Status: ACTIVE | Noted: 2023-11-07

## 2023-11-07 PROBLEM — K27.9 PUD (PEPTIC ULCER DISEASE): Status: ACTIVE | Noted: 2023-11-07

## 2023-11-07 PROBLEM — I87.2 VENOUS INSUFFICIENCY (CHRONIC) (PERIPHERAL): Status: ACTIVE | Noted: 2022-07-24

## 2023-11-07 PROBLEM — Z87.891 PERSONAL HISTORY OF NICOTINE DEPENDENCE: Status: ACTIVE | Noted: 2022-07-24

## 2023-11-07 PROBLEM — R18.8 ASCITES: Status: ACTIVE | Noted: 2023-11-07

## 2023-11-07 PROBLEM — F41.9 ANXIETY DISORDER, UNSPECIFIED: Status: ACTIVE | Noted: 2022-07-24

## 2023-11-07 PROBLEM — K70.30 ALCOHOLIC CIRRHOSIS (MULTI): Status: ACTIVE | Noted: 2023-11-07

## 2023-11-07 RX ORDER — NITROFURANTOIN 25; 75 MG/1; MG/1
1 CAPSULE ORAL
COMMUNITY
End: 2023-11-08 | Stop reason: WASHOUT

## 2023-11-07 RX ORDER — CHOLECALCIFEROL (VITAMIN D3) 50 MCG
2000 TABLET ORAL
COMMUNITY
Start: 2019-12-18 | End: 2023-11-08 | Stop reason: WASHOUT

## 2023-11-07 RX ORDER — NALOXONE HYDROCHLORIDE 4 MG/.1ML
SPRAY NASAL
COMMUNITY
End: 2023-11-08 | Stop reason: WASHOUT

## 2023-11-07 RX ORDER — CALCIUM CARBONATE/VITAMIN D3 600MG-5MCG
1 TABLET ORAL 2 TIMES DAILY
COMMUNITY
Start: 2019-12-18 | End: 2023-11-08 | Stop reason: WASHOUT

## 2023-11-07 RX ORDER — METOPROLOL TARTRATE 25 MG/1
0.5 TABLET, FILM COATED ORAL 2 TIMES DAILY
COMMUNITY
Start: 2022-08-17 | End: 2023-11-08 | Stop reason: WASHOUT

## 2023-11-07 RX ORDER — POTASSIUM CHLORIDE 20 MEQ/1
1 TABLET, EXTENDED RELEASE ORAL DAILY
COMMUNITY
Start: 2015-10-12 | End: 2023-11-08 | Stop reason: WASHOUT

## 2023-11-07 RX ORDER — DEXTROMETHORPHAN HYDROBROMIDE, GUAIFENESIN 5; 100 MG/5ML; MG/5ML
650 LIQUID ORAL 3 TIMES DAILY
COMMUNITY
Start: 2022-02-05 | End: 2023-11-08 | Stop reason: WASHOUT

## 2023-11-07 RX ORDER — PANTOPRAZOLE SODIUM 40 MG/1
TABLET, DELAYED RELEASE ORAL
COMMUNITY
End: 2023-11-08 | Stop reason: WASHOUT

## 2023-11-07 RX ORDER — PHENAZOPYRIDINE HYDROCHLORIDE 100 MG/1
2 TABLET, FILM COATED ORAL 3 TIMES DAILY
COMMUNITY
End: 2023-11-08 | Stop reason: WASHOUT

## 2023-11-07 RX ORDER — LANOLIN ALCOHOL/MO/W.PET/CERES
1 CREAM (GRAM) TOPICAL DAILY
COMMUNITY
End: 2023-11-08 | Stop reason: WASHOUT

## 2023-11-07 RX ORDER — CIPROFLOXACIN 250 MG/1
1 TABLET, FILM COATED ORAL 2 TIMES DAILY
COMMUNITY
End: 2023-11-08 | Stop reason: WASHOUT

## 2023-11-07 RX ORDER — CYCLOBENZAPRINE HCL 5 MG
1 TABLET ORAL NIGHTLY
COMMUNITY
End: 2023-11-08 | Stop reason: WASHOUT

## 2023-11-07 RX ORDER — PROPRANOLOL HYDROCHLORIDE 60 MG/1
60 CAPSULE, EXTENDED RELEASE ORAL EVERY EVENING
COMMUNITY
End: 2023-11-21 | Stop reason: SDUPTHER

## 2023-11-08 ENCOUNTER — OFFICE VISIT (OUTPATIENT)
Dept: CARDIOLOGY | Facility: CLINIC | Age: 78
End: 2023-11-08
Payer: MEDICARE

## 2023-11-08 VITALS — HEART RATE: 56 BPM | SYSTOLIC BLOOD PRESSURE: 138 MMHG | DIASTOLIC BLOOD PRESSURE: 68 MMHG

## 2023-11-08 DIAGNOSIS — I47.10 PAROXYSMAL SVT (SUPRAVENTRICULAR TACHYCARDIA) (CMS-HCC): Primary | ICD-10-CM

## 2023-11-08 PROCEDURE — 3078F DIAST BP <80 MM HG: CPT | Performed by: INTERNAL MEDICINE

## 2023-11-08 PROCEDURE — 1160F RVW MEDS BY RX/DR IN RCRD: CPT | Performed by: INTERNAL MEDICINE

## 2023-11-08 PROCEDURE — 1125F AMNT PAIN NOTED PAIN PRSNT: CPT | Performed by: INTERNAL MEDICINE

## 2023-11-08 PROCEDURE — 1036F TOBACCO NON-USER: CPT | Performed by: INTERNAL MEDICINE

## 2023-11-08 PROCEDURE — 99213 OFFICE O/P EST LOW 20 MIN: CPT | Performed by: INTERNAL MEDICINE

## 2023-11-08 PROCEDURE — 1159F MED LIST DOCD IN RCRD: CPT | Performed by: INTERNAL MEDICINE

## 2023-11-08 PROCEDURE — 3075F SYST BP GE 130 - 139MM HG: CPT | Performed by: INTERNAL MEDICINE

## 2023-11-08 ASSESSMENT — PATIENT HEALTH QUESTIONNAIRE - PHQ9
SUM OF ALL RESPONSES TO PHQ9 QUESTIONS 1 AND 2: 0
1. LITTLE INTEREST OR PLEASURE IN DOING THINGS: NOT AT ALL
2. FEELING DOWN, DEPRESSED OR HOPELESS: NOT AT ALL

## 2023-11-08 NOTE — PROGRESS NOTES
Subjective:  The patient is a 78-year-old white female, followed primarily by Dr. Jose Hall in [place], who presents for follow-up of paroxysmal supraventricular tachycardia.  This rhythm has been documented in the past, particularly in August 2022, at rates of 133-148 bpm, likely AVNRT.  On 1 occasion, adenosine broke the rhythm and on the other occasion intravenous beta-blockers caused it to resolve.  The patient has been treated with oral beta-blockers for over a year now, and has had good suppression of her arrhythmias, typically occurring for just seconds at a time.    The patient's history is remarkable for a variety of other medical problems, including alcoholic hepatic cirrhosis, esophageal varices with intermittent GI bleeding, and severe osteoporosis in her hands.  She is not known to have hypertension or coronary disease.  She has had a neurogenic bladder and recurrent urinary tract infections.    The patient is  and lives at home with her .  She gets around using a walker or wheelchair.  She previously had a job at [employer].  She presented today along with her  and also their daughter named Willow.    Current Outpatient Medications   Medication Sig    famotidine (Pepcid) 20 mg tablet Take 1 tablet (20 mg) by mouth 2 times a day.    furosemide (Lasix) 20 mg tablet Take 1 tablet (20 mg) by mouth once daily.    Generlac 10 gram/15 mL solution Take 15 mL (10 g) by mouth 2 times a day.    propranolol LA (Inderal LA) 60 mg 24 hr capsule Take 1 capsule (60 mg) by mouth once daily in the evening.    spironolactone (Aldactone) 50 mg tablet Take 1 tablet (50 mg) by mouth once daily.    traMADol (Ultram) 50 mg tablet Take 1 tablet (50 mg) by mouth once daily as needed for severe pain (7 - 10) (daily as needed).     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)    Upper GI bleeding    PUD (peptic ulcer disease)    Pain in toes of both feet    Osteoporosis    DJD (degenerative joint disease)    OA (osteoarthritis)    Onychomycosis    Iron deficiency anemia    Peripheral vascular disease (CMS/HCC)    Hypertension    Hypercholesterolemia    Hallux valgus    Edema of lower extremity    Depression    Callosity    CAD (coronary artery disease)    Ascites    Alcoholic cirrhosis (CMS/HCC)    Anxiety disorder, unspecified    Personal history of nicotine dependence    Venous insufficiency (chronic) (peripheral)    Mixed receptive-expressive language disorder    Osteomyelitis of vertebra of thoracolumbar region (CMS/HCC)     Past Surgical History:   Procedure Laterality Date    CT GUIDED PERCUTANEOUS BIOPSY BONE DEEP  4/25/2022    CT GUIDED PERCUTANEOUS BIOPSY BONE DEEP 4/25/2022 [facility] CLINICAL LEGACY    CT GUIDED PERCUTANEOUS BIOPSY BONE DEEP  7/22/2022    CT GUIDED PERCUTANEOUS BIOPSY BONE DEEP 7/22/2022 [facility] CLINICAL LEGACY    OTHER SURGICAL HISTORY  05/28/2019    Hip fracture repair     Objective:  Vitals:    11/08/23 1145   BP: 138/68   Pulse: 56     Exam:  Gen: Tiny woman in no distress, known to be under 5 feet tall and away only 95 pounds.  HEENT: No scleral icterus; mild exophthalmos; missing upper denture plate.  Neck: No jugular venous distention or thyromegaly.  Lungs: Clear to auscultation, with no wheezes or rales.  Heart: Regular rhythm without murmurs or gallops.  Abdomen: Benign, with no organomegaly or masses.  Extremities: Intact distal pulses; 1+ edema below knees bilaterally; arthritic deformities of hands.  Neuro: No focal neurologic abnormalities.  Skin: No cutaneous  lesions.    Impressions:  1.  Alcoholic cirrhosis with portal hypertension, esophageal varices, and intermittent gastrointestinal bleeding.  2.  Paroxysmal supraventricular tachycardia, likely AVNRT, with documented flareups in August 2022.  She has generally done well on beta-blockers, and is now reportedly on long-acting propranolol for suppression.  3.  Other medical problems, including osteoarthritis, hypertension, and recurrent urinary tract infections.    Recommendations:  1.  The patient will simply continue her beta-blocker therapy for now.  2.  She will follow-up with me on an annual basis.  3.  With ER presentations twice a year or more frequently forward pharmacologic conversion of SVT, the patient would be a good candidate for EP testing and ablation, as I once again discussed with her and her family.      Ariel Mccoy MD

## 2023-11-21 DIAGNOSIS — M25.551 PAIN OF RIGHT HIP: ICD-10-CM

## 2023-11-21 DIAGNOSIS — K70.30 ALCOHOLIC CIRRHOSIS OF LIVER WITHOUT ASCITES (MULTI): Primary | ICD-10-CM

## 2023-11-21 DIAGNOSIS — M79.651 THIGH PAIN, MUSCULOSKELETAL, RIGHT: ICD-10-CM

## 2023-11-21 RX ORDER — TRAMADOL HYDROCHLORIDE 50 MG/1
50 TABLET ORAL DAILY PRN
Qty: 30 TABLET | Refills: 0 | Status: SHIPPED | OUTPATIENT
Start: 2023-11-21 | End: 2024-02-06 | Stop reason: SDUPTHER

## 2023-11-21 RX ORDER — PROPRANOLOL HYDROCHLORIDE 60 MG/1
60 CAPSULE, EXTENDED RELEASE ORAL EVERY EVENING
Qty: 90 CAPSULE | Refills: 0 | Status: SHIPPED | OUTPATIENT
Start: 2023-11-21 | End: 2023-11-28 | Stop reason: WASHOUT

## 2023-11-28 RX ORDER — PROPRANOLOL HYDROCHLORIDE 20 MG/1
20 TABLET ORAL DAILY
Qty: 90 TABLET | Refills: 0 | Status: SHIPPED | OUTPATIENT
Start: 2023-11-28 | End: 2024-04-04 | Stop reason: SDUPTHER

## 2024-02-06 ENCOUNTER — LAB (OUTPATIENT)
Dept: LAB | Facility: LAB | Age: 79
End: 2024-02-06
Payer: MEDICARE

## 2024-02-06 ENCOUNTER — OFFICE VISIT (OUTPATIENT)
Dept: PRIMARY CARE | Facility: CLINIC | Age: 79
End: 2024-02-06
Payer: MEDICARE

## 2024-02-06 VITALS
BODY MASS INDEX: 24.3 KG/M2 | HEIGHT: 55 IN | DIASTOLIC BLOOD PRESSURE: 78 MMHG | TEMPERATURE: 96.9 F | WEIGHT: 105 LBS | SYSTOLIC BLOOD PRESSURE: 140 MMHG | HEART RATE: 65 BPM

## 2024-02-06 DIAGNOSIS — K70.30 ALCOHOLIC CIRRHOSIS OF LIVER WITHOUT ASCITES (MULTI): ICD-10-CM

## 2024-02-06 DIAGNOSIS — M25.551 PAIN OF RIGHT HIP: ICD-10-CM

## 2024-02-06 DIAGNOSIS — K76.6 PORTAL HYPERTENSION (MULTI): ICD-10-CM

## 2024-02-06 DIAGNOSIS — M79.651 THIGH PAIN, MUSCULOSKELETAL, RIGHT: ICD-10-CM

## 2024-02-06 DIAGNOSIS — Z00.00 ROUTINE GENERAL MEDICAL EXAMINATION AT HEALTH CARE FACILITY: Primary | ICD-10-CM

## 2024-02-06 DIAGNOSIS — R60.0 EDEMA OF LOWER EXTREMITY: ICD-10-CM

## 2024-02-06 PROBLEM — I73.9 PERIPHERAL VASCULAR DISEASE (CMS-HCC): Status: RESOLVED | Noted: 2023-11-07 | Resolved: 2024-02-06

## 2024-02-06 PROBLEM — M46.25: Status: RESOLVED | Noted: 2022-07-24 | Resolved: 2024-02-06

## 2024-02-06 PROBLEM — M05.761: Status: RESOLVED | Noted: 2023-10-25 | Resolved: 2024-02-06

## 2024-02-06 LAB
AFP SERPL-MCNC: <4 NG/ML (ref 0–9)
ALBUMIN SERPL BCP-MCNC: 4.1 G/DL (ref 3.4–5)
ALP SERPL-CCNC: 70 U/L (ref 33–136)
ALT SERPL W P-5'-P-CCNC: 14 U/L (ref 7–45)
ANION GAP SERPL CALC-SCNC: 12 MMOL/L (ref 10–20)
AST SERPL W P-5'-P-CCNC: 23 U/L (ref 9–39)
BASOPHILS # BLD AUTO: 0.06 X10*3/UL (ref 0–0.1)
BASOPHILS NFR BLD AUTO: 1.3 %
BILIRUB SERPL-MCNC: 1.5 MG/DL (ref 0–1.2)
BUN SERPL-MCNC: 36 MG/DL (ref 6–23)
CALCIUM SERPL-MCNC: 9.6 MG/DL (ref 8.6–10.3)
CHLORIDE SERPL-SCNC: 106 MMOL/L (ref 98–107)
CO2 SERPL-SCNC: 24 MMOL/L (ref 21–32)
CREAT SERPL-MCNC: 1.02 MG/DL (ref 0.5–1.05)
EGFRCR SERPLBLD CKD-EPI 2021: 56 ML/MIN/1.73M*2
EOSINOPHIL # BLD AUTO: 0.15 X10*3/UL (ref 0–0.4)
EOSINOPHIL NFR BLD AUTO: 3.1 %
ERYTHROCYTE [DISTWIDTH] IN BLOOD BY AUTOMATED COUNT: 13.9 % (ref 11.5–14.5)
GLUCOSE SERPL-MCNC: 85 MG/DL (ref 74–99)
HCT VFR BLD AUTO: 35.6 % (ref 36–46)
HGB BLD-MCNC: 10.6 G/DL (ref 12–16)
IMM GRANULOCYTES # BLD AUTO: 0.01 X10*3/UL (ref 0–0.5)
IMM GRANULOCYTES NFR BLD AUTO: 0.2 % (ref 0–0.9)
LYMPHOCYTES # BLD AUTO: 0.63 X10*3/UL (ref 0.8–3)
LYMPHOCYTES NFR BLD AUTO: 13.2 %
MCH RBC QN AUTO: 29 PG (ref 26–34)
MCHC RBC AUTO-ENTMCNC: 29.8 G/DL (ref 32–36)
MCV RBC AUTO: 98 FL (ref 80–100)
MONOCYTES # BLD AUTO: 0.52 X10*3/UL (ref 0.05–0.8)
MONOCYTES NFR BLD AUTO: 10.9 %
NEUTROPHILS # BLD AUTO: 3.4 X10*3/UL (ref 1.6–5.5)
NEUTROPHILS NFR BLD AUTO: 71.3 %
NRBC BLD-RTO: 0 /100 WBCS (ref 0–0)
PLATELET # BLD AUTO: 90 X10*3/UL (ref 150–450)
POTASSIUM SERPL-SCNC: 5.5 MMOL/L (ref 3.5–5.3)
PROT SERPL-MCNC: 6.8 G/DL (ref 6.4–8.2)
RBC # BLD AUTO: 3.65 X10*6/UL (ref 4–5.2)
SODIUM SERPL-SCNC: 136 MMOL/L (ref 136–145)
WBC # BLD AUTO: 4.8 X10*3/UL (ref 4.4–11.3)

## 2024-02-06 PROCEDURE — G0439 PPPS, SUBSEQ VISIT: HCPCS | Performed by: INTERNAL MEDICINE

## 2024-02-06 PROCEDURE — 1123F ACP DISCUSS/DSCN MKR DOCD: CPT | Performed by: INTERNAL MEDICINE

## 2024-02-06 PROCEDURE — 82105 ALPHA-FETOPROTEIN SERUM: CPT

## 2024-02-06 PROCEDURE — 1159F MED LIST DOCD IN RCRD: CPT | Performed by: INTERNAL MEDICINE

## 2024-02-06 PROCEDURE — 3077F SYST BP >= 140 MM HG: CPT | Performed by: INTERNAL MEDICINE

## 2024-02-06 PROCEDURE — 1036F TOBACCO NON-USER: CPT | Performed by: INTERNAL MEDICINE

## 2024-02-06 PROCEDURE — 36415 COLL VENOUS BLD VENIPUNCTURE: CPT

## 2024-02-06 PROCEDURE — 80053 COMPREHEN METABOLIC PANEL: CPT

## 2024-02-06 PROCEDURE — 3078F DIAST BP <80 MM HG: CPT | Performed by: INTERNAL MEDICINE

## 2024-02-06 PROCEDURE — 1160F RVW MEDS BY RX/DR IN RCRD: CPT | Performed by: INTERNAL MEDICINE

## 2024-02-06 PROCEDURE — 85025 COMPLETE CBC W/AUTO DIFF WBC: CPT

## 2024-02-06 PROCEDURE — 1170F FXNL STATUS ASSESSED: CPT | Performed by: INTERNAL MEDICINE

## 2024-02-06 PROCEDURE — 1125F AMNT PAIN NOTED PAIN PRSNT: CPT | Performed by: INTERNAL MEDICINE

## 2024-02-06 PROCEDURE — 1157F ADVNC CARE PLAN IN RCRD: CPT | Performed by: INTERNAL MEDICINE

## 2024-02-06 RX ORDER — FUROSEMIDE 20 MG/1
20 TABLET ORAL DAILY
Qty: 30 TABLET | Refills: 3 | Status: SHIPPED | OUTPATIENT
Start: 2024-02-06

## 2024-02-06 RX ORDER — TRAMADOL HYDROCHLORIDE 50 MG/1
50 TABLET ORAL DAILY PRN
Qty: 30 TABLET | Refills: 0 | Status: SHIPPED | OUTPATIENT
Start: 2024-02-06 | End: 2024-05-28 | Stop reason: SDUPTHER

## 2024-02-06 ASSESSMENT — ACTIVITIES OF DAILY LIVING (ADL)
FEEDING YOURSELF: INDEPENDENT
EATING: INDEPENDENT
ADEQUATE_TO_COMPLETE_ADL: YES
USING TRANSPORTATION: TOTAL CARE
PREPARING MEALS: NEEDS ASSISTANCE
HEARING - LEFT EAR: FUNCTIONAL
TOILETING: INDEPENDENT
GROOMING: INDEPENDENT
TAKING_MEDICATION: NEEDS ASSISTANCE
DOING HOUSEWORK: NEEDS ASSISTANCE
DRESSING: INDEPENDENT
PATIENT'S MEMORY ADEQUATE TO SAFELY COMPLETE DAILY ACTIVITIES?: YES
USING TELEPHONE: INDEPENDENT
BATHING: INDEPENDENT
STIL DRIVING: NO
TAKING MEDICATION: INDEPENDENT
BATHING: INDEPENDENT
JUDGMENT_ADEQUATE_SAFELY_COMPLETE_DAILY_ACTIVITIES: YES
DRESSING YOURSELF: INDEPENDENT
GROCERY SHOPPING: NEEDS ASSISTANCE
NEEDS ASSISTANCE WITH FOOD: INDEPENDENT
GROCERY_SHOPPING: NEEDS ASSISTANCE
HEARING - RIGHT EAR: FUNCTIONAL
MANAGING_FINANCES: INDEPENDENT
WALKS IN HOME: NEEDS ASSISTANCE
DOING_HOUSEWORK: NEEDS ASSISTANCE
PILL BOX USED: NO
MANAGING FINANCES: TOTAL CARE

## 2024-02-06 ASSESSMENT — ENCOUNTER SYMPTOMS
DEPRESSION: 0
OCCASIONAL FEELINGS OF UNSTEADINESS: 0
LOSS OF SENSATION IN FEET: 0

## 2024-02-06 ASSESSMENT — COLUMBIA-SUICIDE SEVERITY RATING SCALE - C-SSRS
2. HAVE YOU ACTUALLY HAD ANY THOUGHTS OF KILLING YOURSELF?: NO
1. IN THE PAST MONTH, HAVE YOU WISHED YOU WERE DEAD OR WISHED YOU COULD GO TO SLEEP AND NOT WAKE UP?: NO

## 2024-02-06 ASSESSMENT — PATIENT HEALTH QUESTIONNAIRE - PHQ9
1. LITTLE INTEREST OR PLEASURE IN DOING THINGS: NOT AT ALL
SUM OF ALL RESPONSES TO PHQ9 QUESTIONS 1 AND 2: 0
SUM OF ALL RESPONSES TO PHQ9 QUESTIONS 1 AND 2: 0
2. FEELING DOWN, DEPRESSED OR HOPELESS: NOT AT ALL
1. LITTLE INTEREST OR PLEASURE IN DOING THINGS: NOT AT ALL
2. FEELING DOWN, DEPRESSED OR HOPELESS: NOT AT ALL

## 2024-02-06 NOTE — PROGRESS NOTES
Subjective   Reason for Visit: Penelope A Leydig is an 78 y.o. female here for a Medicare Wellness visit.     Past Medical, Surgical, and Family History reviewed and updated in chart.    Reviewed all medications by prescribing practitioner or clinical pharmacist (such as prescriptions, OTCs, herbal therapies and supplements) and documented in the medical record.    78-year-old female patient was seen for wellness exam.  To my surprise that she has been doing well even though she has a multiple problems and complications for example to begin with his hip fracture several years ago at that time patient was heavy alcoholic she develop alcoholic cirrhosis ascites, after that the cirrhosis has been stable and well compensated and she never drink alcohol when she was having osteomyelitis of thoracic vertebrae which required prolonged hospitalization and IV antibiotics during that time she has episode of SVT.  She has a history of recurrent anemia from esophageal varices which was bleeding which has not been happening.  She is on tramadol, 1 a day tramadol has been given by me.  Today she came with her daughter, OARRS report has been reviewed by every tramadol renewal.  She is doing well, at home patient is well taken care of by spouse.  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        Patient Care Team:  Jose Hall MD as PCP - General  Jose Hall MD as PCP - [employer] PCP     Review of Systems  Constitutional: Negative.    HENT: Negative.  Negative for tinnitus.    Eyes: Negative.  Negative for blurred vision.   Respiratory: Negative.     Cardiovascular: Negative.  Swelling legs  Gastrointestinal: Negative.  Negative for vomiting.   Musculoskeletal: Negative.    Skin: Negative.    Neurological: Negative.   "Negative for weakness, numbness and headaches.   Psychiatric/Behavioral:  Negative for confusion and memory loss.    Objective   Vitals:  /78 (BP Location: Left arm, Patient Position: Sitting, BP Cuff Size: Adult)   Pulse 65   Temp 36.1 °C (96.9 °F) (Temporal)   Ht 1.321 m (4' 4\")   Wt 47.6 kg (105 lb)   BMI 27.30 kg/m²       Physical Exam  Vitals reviewed.   Constitutional:       Appearance: She is normal weight.   HENT:      Head: Normocephalic. Trauma yesterday with staples  Eyes:      Conjunctiva/sclera: Conjunctivae normal.   Cardiovascular:      Rate and Rhythm: Normal rate and regular rhythm.      Pulses: Normal pulses.   Pulmonary:      Effort: Pulmonary effort is normal.      Breath sounds: Normal breath sounds.   Abdominal:      Palpations: Abdomen is soft.      Hernia: A hernia is present.   Musculoskeletal:         General: Swelling and tenderness present.      Cervical back: Normal range of motion.      Right lower leg: Edema present.      Left lower leg: Edema present. Deformity hands  Skin:     General: Skin is warm and dry.   Neurological:      General: No focal deficit present.   Assessment/Plan   Problem List Items Addressed This Visit       Cirrhosis (CMS/HCC)    Relevant Orders    Alpha-Fetoprotein    Portal hypertension (CMS/HCC)    Relevant Orders    CBC and Auto Differential    Comprehensive Metabolic Panel     Other Visit Diagnoses       Routine general medical examination at health care facility    -  Primary    Pain of right hip        Relevant Medications    traMADol (Ultram) 50 mg tablet    Thigh pain, musculoskeletal, right        Relevant Medications    traMADol (Ultram) 50 mg tablet    Edema of lower extremity        Relevant Medications    furosemide (Lasix) 20 mg tablet        Wellness exam was completed, tramadol was renewed, she has a chronic pain in the right hip and the thigh because of a nonunion of the femur fracture and also collapse of vertebrae and this pain is " what is impairing her daily activities and tramadol has been given for it.  Edema of lower extremities chronic, portal hypertension is chronic but control, CBC and platelets will be checked.  Alpha-fetoprotein level will be checked, alcoholic cirrhosis remains well compensated without any ascites or any systemic complications.  Daughter did not have any questions, she looks well, she is a short statured narrow thin female patient with history of deformed arthritis.  ADLs and IADLs are reviewed, living conditions are excellent, laboratories will be followed, follow-up in 3 months.  Wellness exam and preventive care visit was completed.

## 2024-04-04 DIAGNOSIS — K70.30 ALCOHOLIC CIRRHOSIS OF LIVER WITHOUT ASCITES (MULTI): ICD-10-CM

## 2024-04-04 RX ORDER — PROPRANOLOL HYDROCHLORIDE 20 MG/1
20 TABLET ORAL DAILY
Qty: 90 TABLET | Refills: 0 | Status: SHIPPED | OUTPATIENT
Start: 2024-04-04

## 2024-05-07 ENCOUNTER — APPOINTMENT (OUTPATIENT)
Dept: PRIMARY CARE | Facility: CLINIC | Age: 79
End: 2024-05-07
Payer: MEDICARE

## 2024-05-28 ENCOUNTER — OFFICE VISIT (OUTPATIENT)
Dept: PRIMARY CARE | Facility: CLINIC | Age: 79
End: 2024-05-28
Payer: MEDICARE

## 2024-05-28 VITALS
WEIGHT: 110 LBS | SYSTOLIC BLOOD PRESSURE: 120 MMHG | BODY MASS INDEX: 25.46 KG/M2 | TEMPERATURE: 97.2 F | HEIGHT: 55 IN | DIASTOLIC BLOOD PRESSURE: 80 MMHG | HEART RATE: 59 BPM

## 2024-05-28 DIAGNOSIS — K70.30 ALCOHOLIC CIRRHOSIS OF LIVER WITHOUT ASCITES (MULTI): ICD-10-CM

## 2024-05-28 DIAGNOSIS — I47.10 PAROXYSMAL SVT (SUPRAVENTRICULAR TACHYCARDIA) (CMS-HCC): ICD-10-CM

## 2024-05-28 DIAGNOSIS — M25.551 PAIN OF RIGHT HIP: ICD-10-CM

## 2024-05-28 DIAGNOSIS — M79.651 THIGH PAIN, MUSCULOSKELETAL, RIGHT: ICD-10-CM

## 2024-05-28 DIAGNOSIS — K44.9 HIATAL HERNIA: ICD-10-CM

## 2024-05-28 DIAGNOSIS — I10 PRIMARY HYPERTENSION: Primary | ICD-10-CM

## 2024-05-28 PROCEDURE — 3079F DIAST BP 80-89 MM HG: CPT | Performed by: INTERNAL MEDICINE

## 2024-05-28 PROCEDURE — 1159F MED LIST DOCD IN RCRD: CPT | Performed by: INTERNAL MEDICINE

## 2024-05-28 PROCEDURE — 1036F TOBACCO NON-USER: CPT | Performed by: INTERNAL MEDICINE

## 2024-05-28 PROCEDURE — 99213 OFFICE O/P EST LOW 20 MIN: CPT | Performed by: INTERNAL MEDICINE

## 2024-05-28 PROCEDURE — 1160F RVW MEDS BY RX/DR IN RCRD: CPT | Performed by: INTERNAL MEDICINE

## 2024-05-28 PROCEDURE — 3074F SYST BP LT 130 MM HG: CPT | Performed by: INTERNAL MEDICINE

## 2024-05-28 PROCEDURE — 1123F ACP DISCUSS/DSCN MKR DOCD: CPT | Performed by: INTERNAL MEDICINE

## 2024-05-28 PROCEDURE — 1157F ADVNC CARE PLAN IN RCRD: CPT | Performed by: INTERNAL MEDICINE

## 2024-05-28 RX ORDER — SPIRONOLACTONE 50 MG/1
25 TABLET, FILM COATED ORAL DAILY
Status: SHIPPED
Start: 2024-05-28

## 2024-05-28 RX ORDER — TRAMADOL HYDROCHLORIDE 50 MG/1
50 TABLET ORAL DAILY PRN
Qty: 30 TABLET | Refills: 0 | Status: SHIPPED | OUTPATIENT
Start: 2024-05-28

## 2024-05-28 ASSESSMENT — ENCOUNTER SYMPTOMS
HEADACHES: 0
EYES NEGATIVE: 1
CONSTITUTIONAL NEGATIVE: 1
BACK PAIN: 1
RESPIRATORY NEGATIVE: 1
DIZZINESS: 0
GASTROINTESTINAL NEGATIVE: 1
ARTHRALGIAS: 1
HYPERTENSION: 1
SHORTNESS OF BREATH: 0

## 2024-05-28 NOTE — PROGRESS NOTES
"Subjective   Patient ID: Penelope A Leydig is a 78 y.o. female who presents for Follow-up (Patient here for follow up).    Hypertension  This is a chronic problem. The current episode started more than 1 year ago. The problem has been resolved since onset. The problem is controlled. Pertinent negatives include no chest pain, headaches or shortness of breath. Past treatments include beta blockers and diuretics. There are no compliance problems.    Heart Problem  This is a recurrent (PSVT) problem. The current episode started more than 1 year ago. The problem occurs intermittently. The problem has been resolved. Associated symptoms include arthralgias. Pertinent negatives include no chest pain or headaches. The treatment provided significant relief.      Anemia  Presents for follow-up visit. Symptoms include bruises/bleeds easily and weight loss. There has been no abdominal pain, anorexia, light-headedness or palpitations. Signs of blood loss that are not present include melena. There are no compliance problems.  Side effects of medications include fatigue.    Liver Dysfunction: Patient complains of an abnormal liver function tests within normal limits that has been associated with no obvious evidence for illness. The problem was first noted  8  YEARS ago. A positive history was noted for: CIRRHOSIS, etoh, and exposure to ethanol. Patient denies none, etoh, or exposure to ethanol.    Review of Systems   Constitutional: Negative.    Eyes: Negative.    Respiratory: Negative.  Negative for shortness of breath.    Cardiovascular:  Positive for leg swelling. Negative for chest pain.   Gastrointestinal: Negative.    Musculoskeletal:  Positive for arthralgias and back pain.   Skin: Negative.    Neurological:  Negative for dizziness and headaches.       Objective   /80 (BP Location: Right arm, Patient Position: Sitting, BP Cuff Size: Adult)   Pulse 59   Temp 36.2 °C (97.2 °F) (Temporal)   Ht 1.321 m (4' 4\")   Wt " 49.9 kg (110 lb)   BMI 28.60 kg/m²     Physical Exam  Constitutional:       Appearance: She is normal weight.   HENT:      Head: Normocephalic. Trauma yesterday with staples  Eyes:      Conjunctiva/sclera: Conjunctivae normal.   Cardiovascular:      Rate and Rhythm: Normal rate and regular rhythm.      Pulses: Normal pulses.   Pulmonary:      Effort: Pulmonary effort is normal.      Breath sounds: Normal breath sounds.   Abdominal:      Palpations: Abdomen is soft.      Hernia: A hernia is present.   Musculoskeletal:         General: Swelling and tenderness present.      Cervical back: Normal range of motion.      Right lower leg: Edema present.      Left lower leg: Edema present. Deformity hands  Skin:     General: Skin is warm and dry.   Neurological:      General: No focal deficit present.   Assessment/Plan   Problem List Items Addressed This Visit             ICD-10-CM    Cirrhosis (Multi) K74.60    Hiatal hernia K44.9    Paroxysmal SVT (supraventricular tachycardia) (CMS-HCC) I47.10    Relevant Medications    spironolactone (Aldactone) 50 mg tablet    Hypertension - Primary I10   Patient continued to do well, no fall, no injury, no ER visit.  She is on tramadol, 30 tramadol has been used in almost 60 to 90 days timeframe, OARRS report has been reviewed by me.  She does not have any obvious spells of SVT although last time her potassium was 5.5 we cut down spironolactone to 25 mg and I understand that usually Lasix and spironolactone has been given on the 20:50 ratio, we will check potassium again, if potassium levels are stable then may resume spironolactone at 50 mg.  BP readings are stable, there is no ascites, alpha-fetoprotein was reviewed, liver ultrasound is warranted, pain and discomfort in the hip persist and tramadol is given because of that.  Hiatus hernia has been large, not a surgical candidate, last hemoglobin was stable, thrombocytopenia persist.  Spouse was present at bedside, we will follow the  laboratories, follow-up in 3 months, she is a frail female patient almost nonambulatory and quite osteoporotic and deformed joints.

## 2024-05-30 ENCOUNTER — LAB (OUTPATIENT)
Dept: LAB | Facility: LAB | Age: 79
End: 2024-05-30
Payer: MEDICARE

## 2024-05-30 DIAGNOSIS — K70.30 ALCOHOLIC CIRRHOSIS OF LIVER WITHOUT ASCITES (MULTI): ICD-10-CM

## 2024-05-30 LAB
ALBUMIN SERPL BCP-MCNC: 4.2 G/DL (ref 3.4–5)
ALP SERPL-CCNC: 56 U/L (ref 33–136)
ALT SERPL W P-5'-P-CCNC: 10 U/L (ref 7–45)
ANION GAP SERPL CALC-SCNC: 14 MMOL/L (ref 10–20)
AST SERPL W P-5'-P-CCNC: 22 U/L (ref 9–39)
BASOPHILS # BLD AUTO: 0.05 X10*3/UL (ref 0–0.1)
BASOPHILS NFR BLD AUTO: 1.3 %
BILIRUB SERPL-MCNC: 1.7 MG/DL (ref 0–1.2)
BUN SERPL-MCNC: 32 MG/DL (ref 6–23)
CALCIUM SERPL-MCNC: 9.5 MG/DL (ref 8.6–10.3)
CHLORIDE SERPL-SCNC: 110 MMOL/L (ref 98–107)
CO2 SERPL-SCNC: 23 MMOL/L (ref 21–32)
CREAT SERPL-MCNC: 0.94 MG/DL (ref 0.5–1.05)
EGFRCR SERPLBLD CKD-EPI 2021: 62 ML/MIN/1.73M*2
EOSINOPHIL # BLD AUTO: 0.15 X10*3/UL (ref 0–0.4)
EOSINOPHIL NFR BLD AUTO: 3.8 %
ERYTHROCYTE [DISTWIDTH] IN BLOOD BY AUTOMATED COUNT: 15.9 % (ref 11.5–14.5)
GLUCOSE SERPL-MCNC: 100 MG/DL (ref 74–99)
HCT VFR BLD AUTO: 38.5 % (ref 36–46)
HGB BLD-MCNC: 11.5 G/DL (ref 12–16)
IMM GRANULOCYTES # BLD AUTO: 0 X10*3/UL (ref 0–0.5)
IMM GRANULOCYTES NFR BLD AUTO: 0 % (ref 0–0.9)
LYMPHOCYTES # BLD AUTO: 0.5 X10*3/UL (ref 0.8–3)
LYMPHOCYTES NFR BLD AUTO: 12.8 %
MCH RBC QN AUTO: 27.9 PG (ref 26–34)
MCHC RBC AUTO-ENTMCNC: 29.9 G/DL (ref 32–36)
MCV RBC AUTO: 93 FL (ref 80–100)
MONOCYTES # BLD AUTO: 0.28 X10*3/UL (ref 0.05–0.8)
MONOCYTES NFR BLD AUTO: 7.2 %
NEUTROPHILS # BLD AUTO: 2.93 X10*3/UL (ref 1.6–5.5)
NEUTROPHILS NFR BLD AUTO: 74.9 %
NRBC BLD-RTO: 0 /100 WBCS (ref 0–0)
PLATELET # BLD AUTO: 81 X10*3/UL (ref 150–450)
POTASSIUM SERPL-SCNC: 4.7 MMOL/L (ref 3.5–5.3)
PROT SERPL-MCNC: 6.4 G/DL (ref 6.4–8.2)
RBC # BLD AUTO: 4.12 X10*6/UL (ref 4–5.2)
SODIUM SERPL-SCNC: 142 MMOL/L (ref 136–145)
WBC # BLD AUTO: 3.9 X10*3/UL (ref 4.4–11.3)

## 2024-05-30 PROCEDURE — 36415 COLL VENOUS BLD VENIPUNCTURE: CPT

## 2024-05-30 PROCEDURE — 85025 COMPLETE CBC W/AUTO DIFF WBC: CPT

## 2024-05-30 PROCEDURE — 80053 COMPREHEN METABOLIC PANEL: CPT

## 2024-07-09 DIAGNOSIS — K70.30 ALCOHOLIC CIRRHOSIS OF LIVER WITHOUT ASCITES (MULTI): ICD-10-CM

## 2024-07-10 RX ORDER — PROPRANOLOL HYDROCHLORIDE 20 MG/1
20 TABLET ORAL DAILY
Qty: 90 TABLET | Refills: 1 | Status: SHIPPED | OUTPATIENT
Start: 2024-07-10

## 2024-08-12 ENCOUNTER — HOSPITAL ENCOUNTER (OUTPATIENT)
Dept: RADIOLOGY | Facility: HOSPITAL | Age: 79
Discharge: HOME | End: 2024-08-12
Payer: MEDICARE

## 2024-08-12 DIAGNOSIS — K70.30 ALCOHOLIC CIRRHOSIS OF LIVER WITHOUT ASCITES (MULTI): ICD-10-CM

## 2024-08-12 PROCEDURE — 76705 ECHO EXAM OF ABDOMEN: CPT

## 2024-08-12 PROCEDURE — 76705 ECHO EXAM OF ABDOMEN: CPT | Performed by: RADIOLOGY

## 2024-08-28 ENCOUNTER — LAB (OUTPATIENT)
Dept: LAB | Facility: LAB | Age: 79
End: 2024-08-28
Payer: MEDICARE

## 2024-08-28 ENCOUNTER — APPOINTMENT (OUTPATIENT)
Dept: PRIMARY CARE | Facility: CLINIC | Age: 79
End: 2024-08-28
Payer: MEDICARE

## 2024-08-28 VITALS
HEIGHT: 55 IN | HEART RATE: 78 BPM | SYSTOLIC BLOOD PRESSURE: 122 MMHG | WEIGHT: 110 LBS | DIASTOLIC BLOOD PRESSURE: 78 MMHG | BODY MASS INDEX: 25.46 KG/M2 | TEMPERATURE: 98.2 F

## 2024-08-28 DIAGNOSIS — I10 PRIMARY HYPERTENSION: ICD-10-CM

## 2024-08-28 DIAGNOSIS — M05.761 RHEUMATOID ARTHRITIS INVOLVING RIGHT KNEE WITH POSITIVE RHEUMATOID FACTOR (MULTI): ICD-10-CM

## 2024-08-28 DIAGNOSIS — K44.9 HIATAL HERNIA: ICD-10-CM

## 2024-08-28 DIAGNOSIS — K70.30 ALCOHOLIC CIRRHOSIS OF LIVER WITHOUT ASCITES (MULTI): ICD-10-CM

## 2024-08-28 DIAGNOSIS — D63.8 ANEMIA OF CHRONIC DISEASE: ICD-10-CM

## 2024-08-28 DIAGNOSIS — K70.30 ALCOHOLIC CIRRHOSIS OF LIVER WITHOUT ASCITES (MULTI): Primary | ICD-10-CM

## 2024-08-28 LAB
ALBUMIN SERPL BCP-MCNC: 3.9 G/DL (ref 3.4–5)
ALP SERPL-CCNC: 50 U/L (ref 33–136)
ALT SERPL W P-5'-P-CCNC: 10 U/L (ref 7–45)
ANION GAP SERPL CALC-SCNC: 14 MMOL/L (ref 10–20)
AST SERPL W P-5'-P-CCNC: 20 U/L (ref 9–39)
BASOPHILS # BLD AUTO: 0.04 X10*3/UL (ref 0–0.1)
BASOPHILS NFR BLD AUTO: 1.2 %
BILIRUB SERPL-MCNC: 1.9 MG/DL (ref 0–1.2)
BUN SERPL-MCNC: 25 MG/DL (ref 6–23)
CALCIUM SERPL-MCNC: 9.5 MG/DL (ref 8.6–10.3)
CHLORIDE SERPL-SCNC: 109 MMOL/L (ref 98–107)
CO2 SERPL-SCNC: 23 MMOL/L (ref 21–32)
CREAT SERPL-MCNC: 0.95 MG/DL (ref 0.5–1.05)
EGFRCR SERPLBLD CKD-EPI 2021: 61 ML/MIN/1.73M*2
EOSINOPHIL # BLD AUTO: 0.08 X10*3/UL (ref 0–0.4)
EOSINOPHIL NFR BLD AUTO: 2.4 %
ERYTHROCYTE [DISTWIDTH] IN BLOOD BY AUTOMATED COUNT: 15.5 % (ref 11.5–14.5)
GLUCOSE SERPL-MCNC: 121 MG/DL (ref 74–99)
HCT VFR BLD AUTO: 34.3 % (ref 36–46)
HGB BLD-MCNC: 10.7 G/DL (ref 12–16)
IMM GRANULOCYTES # BLD AUTO: 0.01 X10*3/UL (ref 0–0.5)
IMM GRANULOCYTES NFR BLD AUTO: 0.3 % (ref 0–0.9)
LYMPHOCYTES # BLD AUTO: 0.48 X10*3/UL (ref 0.8–3)
LYMPHOCYTES NFR BLD AUTO: 14.1 %
MCH RBC QN AUTO: 31.2 PG (ref 26–34)
MCHC RBC AUTO-ENTMCNC: 31.2 G/DL (ref 32–36)
MCV RBC AUTO: 100 FL (ref 80–100)
MONOCYTES # BLD AUTO: 0.32 X10*3/UL (ref 0.05–0.8)
MONOCYTES NFR BLD AUTO: 9.4 %
NEUTROPHILS # BLD AUTO: 2.47 X10*3/UL (ref 1.6–5.5)
NEUTROPHILS NFR BLD AUTO: 72.6 %
NRBC BLD-RTO: 0 /100 WBCS (ref 0–0)
PLATELET # BLD AUTO: 66 X10*3/UL (ref 150–450)
POTASSIUM SERPL-SCNC: 4.6 MMOL/L (ref 3.5–5.3)
PROT SERPL-MCNC: 6 G/DL (ref 6.4–8.2)
RBC # BLD AUTO: 3.43 X10*6/UL (ref 4–5.2)
SODIUM SERPL-SCNC: 141 MMOL/L (ref 136–145)
WBC # BLD AUTO: 3.4 X10*3/UL (ref 4.4–11.3)

## 2024-08-28 PROCEDURE — 36415 COLL VENOUS BLD VENIPUNCTURE: CPT

## 2024-08-28 PROCEDURE — 85025 COMPLETE CBC W/AUTO DIFF WBC: CPT

## 2024-08-28 PROCEDURE — 99213 OFFICE O/P EST LOW 20 MIN: CPT | Performed by: INTERNAL MEDICINE

## 2024-08-28 PROCEDURE — 1123F ACP DISCUSS/DSCN MKR DOCD: CPT | Performed by: INTERNAL MEDICINE

## 2024-08-28 PROCEDURE — 1159F MED LIST DOCD IN RCRD: CPT | Performed by: INTERNAL MEDICINE

## 2024-08-28 PROCEDURE — 80053 COMPREHEN METABOLIC PANEL: CPT

## 2024-08-28 PROCEDURE — 1160F RVW MEDS BY RX/DR IN RCRD: CPT | Performed by: INTERNAL MEDICINE

## 2024-08-28 PROCEDURE — 1157F ADVNC CARE PLAN IN RCRD: CPT | Performed by: INTERNAL MEDICINE

## 2024-08-28 PROCEDURE — 3074F SYST BP LT 130 MM HG: CPT | Performed by: INTERNAL MEDICINE

## 2024-08-28 PROCEDURE — 1036F TOBACCO NON-USER: CPT | Performed by: INTERNAL MEDICINE

## 2024-08-28 PROCEDURE — 3078F DIAST BP <80 MM HG: CPT | Performed by: INTERNAL MEDICINE

## 2024-08-28 ASSESSMENT — ENCOUNTER SYMPTOMS
ABDOMINAL DISTENTION: 0
WEAKNESS: 0
NEUROLOGICAL NEGATIVE: 1
ABDOMINAL PAIN: 0
RESPIRATORY NEGATIVE: 1
BRUISES/BLEEDS EASILY: 0
CONSTITUTIONAL NEGATIVE: 1
ARTHRALGIAS: 1

## 2024-08-28 NOTE — PROGRESS NOTES
Subjective   Patient ID: Penelope A Leydig is a 79 y.o. female who presents for Follow-up (3 mo fu).  HPI  Hypertension  This is a chronic problem. The current episode started more than 1 year ago. The problem has been resolved since onset. The problem is controlled. Pertinent negatives include no chest pain, headaches or shortness of breath. Past treatments include beta blockers and diuretics. There are no compliance problems.    Heart Problem  This is a recurrent (PSVT) problem. The current episode started more than 1 year ago. The problem occurs intermittently. The problem has been resolved. Associated symptoms include arthralgias. Pertinent negatives include no chest pain or headaches. The treatment provided significant relief.   Cirrhosis  Patient presents with  no sympts .  Onset of symptoms was gradual starting several years ago with stable course since that time. Patient also notes  stable pattern . Symptoms improve with none. Symptoms worsen with none. There is history of history of alcohol abuse former . There is no history of alcohol use. Past history includes ascites and cirrhosis. Previous studies include upper endoscopy.     Anemia  Presents for follow-up visit. Symptoms include bruises/bleeds easily and weight loss. There has been no abdominal pain, anorexia, light-headedness or palpitations. Signs of blood loss that are not present include melena. There are no compliance problems.  Side effects of medications include fatigue.   Past Medical History  History reviewed. No pertinent past medical history.    Social History  Social History     Tobacco Use    Smoking status: Former     Current packs/day: 0.00     Types: Cigarettes     Quit date: 1980     Years since quittin.6    Smokeless tobacco: Never   Vaping Use    Vaping status: Never Used   Substance Use Topics    Alcohol use: Not Currently    Drug use: Never       Family History     Family History   Problem Relation Name Age of Onset     Cancer Mother      Lung cancer Father      Heart attack Father         Allergies:  Allergies   Allergen Reactions    Iodinated Contrast Media Itching    Iodine Swelling and Itching     Lip swelling    Meperidine Other     IV--delirium/confusion    Other Other    Shellfish Containing Products Unknown        Outpatient Medications:  Current Outpatient Medications   Medication Instructions    famotidine (Pepcid) 20 mg tablet 1 tablet, oral, 2 times daily    furosemide (LASIX) 20 mg, oral, Daily    Generlac 10 g, oral, 2 times daily    propranolol (INDERAL) 20 mg, oral, Daily    spironolactone (ALDACTONE) 25 mg, oral, Daily    traMADol (ULTRAM) 50 mg, oral, Daily PRN        Review of Systems   Constitutional: Negative.    Respiratory: Negative.     Cardiovascular:  Positive for leg swelling.   Gastrointestinal:  Negative for abdominal distention and abdominal pain.   Musculoskeletal:  Positive for arthralgias.   Skin: Negative.    Neurological: Negative.  Negative for weakness.   Hematological:  Does not bruise/bleed easily.         Objective       Physical Exam  Vitals reviewed.   Constitutional:       Appearance: Normal appearance. She is normal weight.   HENT:      Head: Normocephalic.   Eyes:      Conjunctiva/sclera: Conjunctivae normal.   Cardiovascular:      Rate and Rhythm: Normal rate and regular rhythm.      Heart sounds: Murmur heard.   Pulmonary:      Effort: Pulmonary effort is normal.      Breath sounds: Normal breath sounds.   Abdominal:      Palpations: Abdomen is soft.   Musculoskeletal:         General: Tenderness and deformity present.      Cervical back: Neck supple.      Right lower leg: Edema present.      Left lower leg: Edema present.   Skin:     General: Skin is warm and dry.   Neurological:      General: No focal deficit present.   Psychiatric:         Mood and Affect: Mood normal.       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 (Multi)            She continued to do well, today she was accompanied by her daughter, she has a cirrhosis of liver for more than 7 years, portal hypertension, it is compensated cirrhosis of liver, no ascites, spironolactone and Lasix therapy has been maintained, lactulose has been maintained, hemoglobin was stable, thrombocytopenia remains, no fall, remains on tramadol, tramadol has been used less frequently, has a deformed hands from rheumatoid arthritis which has been left alone, no spells of SVT, propranolol therapy to be continued, she is not bradycardic, she has a huge hiatus hernia which has been left alone, no bleeding, any decompensation or bleeding episode would be devastating to her, daughter did not have any question, laboratories will be done today, edema remains, limited mobility remains, follow-up in 3 months.  "

## 2024-09-11 DIAGNOSIS — K59.00 CONSTIPATION, UNSPECIFIED CONSTIPATION TYPE: ICD-10-CM

## 2024-09-11 RX ORDER — LACTULOSE 10 G/15ML
10 SOLUTION ORAL 2 TIMES DAILY
Qty: 900 ML | Refills: 5 | Status: SHIPPED | OUTPATIENT
Start: 2024-09-11 | End: 2025-03-10

## 2024-10-10 DIAGNOSIS — K44.9 HIATAL HERNIA: ICD-10-CM

## 2024-10-10 RX ORDER — FAMOTIDINE 20 MG/1
20 TABLET, FILM COATED ORAL 2 TIMES DAILY
Qty: 180 TABLET | Refills: 1 | Status: SHIPPED | OUTPATIENT
Start: 2024-10-10

## 2024-10-21 DIAGNOSIS — K70.30 ALCOHOLIC CIRRHOSIS OF LIVER WITHOUT ASCITES (MULTI): ICD-10-CM

## 2024-10-21 RX ORDER — PROPRANOLOL HYDROCHLORIDE 20 MG/1
20 TABLET ORAL DAILY
Qty: 90 TABLET | Refills: 3 | Status: SHIPPED | OUTPATIENT
Start: 2024-10-21 | End: 2024-10-23 | Stop reason: SDUPTHER

## 2024-10-23 DIAGNOSIS — K70.30 ALCOHOLIC CIRRHOSIS OF LIVER WITHOUT ASCITES (MULTI): ICD-10-CM

## 2024-10-23 RX ORDER — PROPRANOLOL HYDROCHLORIDE 20 MG/1
20 TABLET ORAL DAILY
Qty: 90 TABLET | Refills: 3 | Status: SHIPPED | OUTPATIENT
Start: 2024-10-23

## 2024-10-25 DIAGNOSIS — I10 HYPERTENSION, UNSPECIFIED TYPE: ICD-10-CM

## 2024-10-25 RX ORDER — NADOLOL 20 MG/1
1 TABLET ORAL NIGHTLY
COMMUNITY
Start: 2023-03-07 | End: 2024-10-25 | Stop reason: SDUPTHER

## 2024-10-25 RX ORDER — NADOLOL 20 MG/1
20 TABLET ORAL NIGHTLY
Qty: 90 TABLET | Refills: 1 | Status: SHIPPED | OUTPATIENT
Start: 2024-10-25

## 2024-11-06 ENCOUNTER — APPOINTMENT (OUTPATIENT)
Dept: CARDIOLOGY | Facility: CLINIC | Age: 79
End: 2024-11-06
Payer: MEDICARE

## 2024-11-06 VITALS
HEART RATE: 60 BPM | BODY MASS INDEX: 25.55 KG/M2 | SYSTOLIC BLOOD PRESSURE: 142 MMHG | WEIGHT: 110.4 LBS | DIASTOLIC BLOOD PRESSURE: 77 MMHG | HEIGHT: 55 IN

## 2024-11-06 DIAGNOSIS — I47.10 PAROXYSMAL SVT (SUPRAVENTRICULAR TACHYCARDIA) (CMS-HCC): Primary | ICD-10-CM

## 2024-11-06 PROCEDURE — 1036F TOBACCO NON-USER: CPT | Performed by: INTERNAL MEDICINE

## 2024-11-06 PROCEDURE — 1159F MED LIST DOCD IN RCRD: CPT | Performed by: INTERNAL MEDICINE

## 2024-11-06 PROCEDURE — 1157F ADVNC CARE PLAN IN RCRD: CPT | Performed by: INTERNAL MEDICINE

## 2024-11-06 PROCEDURE — 1123F ACP DISCUSS/DSCN MKR DOCD: CPT | Performed by: INTERNAL MEDICINE

## 2024-11-06 PROCEDURE — 3077F SYST BP >= 140 MM HG: CPT | Performed by: INTERNAL MEDICINE

## 2024-11-06 PROCEDURE — 99213 OFFICE O/P EST LOW 20 MIN: CPT | Performed by: INTERNAL MEDICINE

## 2024-11-06 PROCEDURE — 3078F DIAST BP <80 MM HG: CPT | Performed by: INTERNAL MEDICINE

## 2024-11-06 NOTE — PROGRESS NOTES
Subjective:  The patient is a 79-year-old white female, followed primarily by Dr. Jose Hall in [place], who presents for follow-up of paroxysmal supraventricular tachycardia.  This rhythm was documented in August 2022, with rates of 133-148 bpm, and was felt to most likely represent AVNRT.  She has visited the ER a few times and the tachycardia terminated once with beta-blockers and another time with adenosine.  She has been on low-dose beta-blocker therapy with excellent suppression of symptomatic arrhythmias.    The patient has a history of other medical issues, including alcoholic hepatic cirrhosis, esophageal varices with prior GI bleeding, osteoporosis in her hands, and neurogenic bladder, and recurrent urinary tract infections.    Current Outpatient Medications   Medication Sig    famotidine (Pepcid) 20 mg tablet TAKE 1 TABLET BY MOUTH TWICE DAILY    furosemide (Lasix) 20 mg tablet Take 1 tablet (20 mg) by mouth once daily.    Generlac 10 gram/15 mL solution Take 15 mL (10 g) by mouth 2 times a day.    lactulose 20 gram/30 mL oral solution Take 15 mL (10 g) by mouth 2 times a day.    nadolol (Corgard) 20 mg tablet Take 1 tablet (20 mg) by mouth once daily at bedtime.    propranolol (Inderal) 20 mg tablet Take 1 tablet (20 mg) by mouth once daily as needed for PSVT breakthroughs.    spironolactone (Aldactone) 50 mg tablet Take 0.5 tablets (25 mg) by mouth once daily.    traMADol (Ultram) 50 mg tablet Take 1 tablet (50 mg) by mouth once daily as needed for severe pain (7 - 10) (daily as needed).     Allergies:  Iodinated contrast media, Iodine, Meperidine, Other, and Shellfish containing products     Patient Active Problem List   Diagnosis    Anemia of chronic disease    Cirrhosis (Multi)    Hiatal hernia    Paroxysmal SVT (supraventricular tachycardia) (CMS-HCC)    Portal hypertension (Multi)    Thrombocytopenia due to hypersplenism    Osteoporosis    DJD (degenerative joint disease)    OA (osteoarthritis)  "   Hypertension    Hypercholesterolemia     Past Surgical History:   Procedure Laterality Date    CT GUIDED PERCUTANEOUS BIOPSY BONE DEEP  4/25/2022    CT GUIDED PERCUTANEOUS BIOPSY BONE DEEP 4/25/2022 [facility] CLINICAL LEGACY    CT GUIDED PERCUTANEOUS BIOPSY BONE DEEP  7/22/2022    CT GUIDED PERCUTANEOUS BIOPSY BONE DEEP 7/22/2022 [facility] CLINICAL LEGACY    OTHER SURGICAL HISTORY  05/28/2019    Hip fracture repair     Objective:  Vitals:    11/06/24 1144   BP: 142/77   Pulse: 60   Height:     1.321 m (4' 4\")  Weight: 50.1 kg (110 lb 6.4 oz)     Exam:  Gen: Very tiny woman sitting comfortably in a wheelchair, accompanied by her daughter Willow.  HEENT: No scleral icterus; edentulous; trivial exophthalmos.  Neck: No jugular venous distention or thyromegaly.  Lungs: Clear to auscultation, with no wheezes or rales.  Heart: Regular rhythm without murmurs or gallops.  Abdomen: Suspicious for broad-based right sided ventral hernia.  Extremities: Intact distal pulses; 1+ edema in both ankles despite wraps; arthritic deformities of hands.  Neuro: No focal neurologic abnormalities.  Skin: No cutaneous lesions.    Impressions:  1.  Alcoholic cirrhosis with portal hypertension, esophageal varices, and intermittent gastrointestinal bleeding.  2.  Paroxysmal supraventricular tachycardia, likely AVNRT.  She is now on a long-acting beta-blocker (nadolol 20 mg daily) for prevention, and also has a short acting beta-blocker (propranolol 20 mg) to take as needed for breakthroughs.  3.  Other medical problems, including osteoarthritis, hypertension, and recurrent urinary tract infections.    Recommendations:  1.  The patient will simply remain on beta-blocker therapy for now, since she has had good suppression of her symptomatic SVT.  2.  If she requires ER visits 2 or more times in a year for pharmacologic cardioversion, it would then be reasonable to perform an EP study with radiofrequency ablation to seek to eliminate her arrhythmia " substrate.  3.  She will follow-up with electrophysiology in [place] in 2025.      Ariel Mccoy MD

## 2024-11-19 DIAGNOSIS — I47.10 PAROXYSMAL SVT (SUPRAVENTRICULAR TACHYCARDIA) (CMS-HCC): ICD-10-CM

## 2024-11-19 RX ORDER — SPIRONOLACTONE 50 MG/1
25 TABLET, FILM COATED ORAL DAILY
Qty: 45 TABLET | Refills: 3 | Status: SHIPPED | OUTPATIENT
Start: 2024-11-19 | End: 2025-11-19

## 2024-11-27 ENCOUNTER — APPOINTMENT (OUTPATIENT)
Dept: PRIMARY CARE | Facility: CLINIC | Age: 79
End: 2024-11-27
Payer: MEDICARE

## 2024-11-27 ENCOUNTER — LAB (OUTPATIENT)
Dept: LAB | Facility: LAB | Age: 79
End: 2024-11-27
Payer: MEDICARE

## 2024-11-27 VITALS
HEART RATE: 126 BPM | SYSTOLIC BLOOD PRESSURE: 100 MMHG | BODY MASS INDEX: 28.6 KG/M2 | WEIGHT: 110 LBS | DIASTOLIC BLOOD PRESSURE: 68 MMHG | TEMPERATURE: 95.3 F

## 2024-11-27 DIAGNOSIS — K70.30 ALCOHOLIC CIRRHOSIS OF LIVER WITHOUT ASCITES (MULTI): ICD-10-CM

## 2024-11-27 DIAGNOSIS — M25.551 PAIN OF RIGHT HIP: ICD-10-CM

## 2024-11-27 DIAGNOSIS — D63.8 ANEMIA OF CHRONIC DISEASE: ICD-10-CM

## 2024-11-27 DIAGNOSIS — K44.9 HIATAL HERNIA: ICD-10-CM

## 2024-11-27 DIAGNOSIS — I10 PRIMARY HYPERTENSION: ICD-10-CM

## 2024-11-27 DIAGNOSIS — M79.651 THIGH PAIN, MUSCULOSKELETAL, RIGHT: ICD-10-CM

## 2024-11-27 DIAGNOSIS — I47.10 PAROXYSMAL SVT (SUPRAVENTRICULAR TACHYCARDIA) (CMS-HCC): ICD-10-CM

## 2024-11-27 DIAGNOSIS — K70.30 ALCOHOLIC CIRRHOSIS OF LIVER WITHOUT ASCITES (MULTI): Primary | ICD-10-CM

## 2024-11-27 LAB
AFP SERPL-MCNC: <4 NG/ML (ref 0–9)
ALBUMIN SERPL BCP-MCNC: 3.9 G/DL (ref 3.4–5)
ALP SERPL-CCNC: 59 U/L (ref 33–136)
ALT SERPL W P-5'-P-CCNC: 9 U/L (ref 7–45)
ANION GAP SERPL CALC-SCNC: 12 MMOL/L (ref 10–20)
AST SERPL W P-5'-P-CCNC: 20 U/L (ref 9–39)
BASOPHILS # BLD AUTO: 0.1 X10*3/UL (ref 0–0.1)
BASOPHILS NFR BLD AUTO: 1.8 %
BILIRUB SERPL-MCNC: 2.1 MG/DL (ref 0–1.2)
BUN SERPL-MCNC: 26 MG/DL (ref 6–23)
CALCIUM SERPL-MCNC: 9.3 MG/DL (ref 8.6–10.3)
CHLORIDE SERPL-SCNC: 106 MMOL/L (ref 98–107)
CO2 SERPL-SCNC: 26 MMOL/L (ref 21–32)
CREAT SERPL-MCNC: 1.19 MG/DL (ref 0.5–1.05)
EGFRCR SERPLBLD CKD-EPI 2021: 47 ML/MIN/1.73M*2
EOSINOPHIL # BLD AUTO: 0.17 X10*3/UL (ref 0–0.4)
EOSINOPHIL NFR BLD AUTO: 3.1 %
ERYTHROCYTE [DISTWIDTH] IN BLOOD BY AUTOMATED COUNT: 14.5 % (ref 11.5–14.5)
GLUCOSE SERPL-MCNC: 120 MG/DL (ref 74–99)
HCT VFR BLD AUTO: 32.7 % (ref 36–46)
HGB BLD-MCNC: 10.1 G/DL (ref 12–16)
IMM GRANULOCYTES # BLD AUTO: 0.01 X10*3/UL (ref 0–0.5)
IMM GRANULOCYTES NFR BLD AUTO: 0.2 % (ref 0–0.9)
LYMPHOCYTES # BLD AUTO: 0.77 X10*3/UL (ref 0.8–3)
LYMPHOCYTES NFR BLD AUTO: 13.8 %
MCH RBC QN AUTO: 29.5 PG (ref 26–34)
MCHC RBC AUTO-ENTMCNC: 30.9 G/DL (ref 32–36)
MCV RBC AUTO: 96 FL (ref 80–100)
MONOCYTES # BLD AUTO: 0.71 X10*3/UL (ref 0.05–0.8)
MONOCYTES NFR BLD AUTO: 12.8 %
NEUTROPHILS # BLD AUTO: 3.8 X10*3/UL (ref 1.6–5.5)
NEUTROPHILS NFR BLD AUTO: 68.3 %
NRBC BLD-RTO: 0 /100 WBCS (ref 0–0)
PLATELET # BLD AUTO: 117 X10*3/UL (ref 150–450)
POTASSIUM SERPL-SCNC: 4.4 MMOL/L (ref 3.5–5.3)
PROT SERPL-MCNC: 6.1 G/DL (ref 6.4–8.2)
RBC # BLD AUTO: 3.42 X10*6/UL (ref 4–5.2)
SODIUM SERPL-SCNC: 140 MMOL/L (ref 136–145)
WBC # BLD AUTO: 5.6 X10*3/UL (ref 4.4–11.3)

## 2024-11-27 PROCEDURE — 82105 ALPHA-FETOPROTEIN SERUM: CPT

## 2024-11-27 PROCEDURE — 1036F TOBACCO NON-USER: CPT | Performed by: INTERNAL MEDICINE

## 2024-11-27 PROCEDURE — 3078F DIAST BP <80 MM HG: CPT | Performed by: INTERNAL MEDICINE

## 2024-11-27 PROCEDURE — G2211 COMPLEX E/M VISIT ADD ON: HCPCS | Performed by: INTERNAL MEDICINE

## 2024-11-27 PROCEDURE — 85025 COMPLETE CBC W/AUTO DIFF WBC: CPT

## 2024-11-27 PROCEDURE — 3074F SYST BP LT 130 MM HG: CPT | Performed by: INTERNAL MEDICINE

## 2024-11-27 PROCEDURE — 80053 COMPREHEN METABOLIC PANEL: CPT

## 2024-11-27 PROCEDURE — 1123F ACP DISCUSS/DSCN MKR DOCD: CPT | Performed by: INTERNAL MEDICINE

## 2024-11-27 PROCEDURE — 1157F ADVNC CARE PLAN IN RCRD: CPT | Performed by: INTERNAL MEDICINE

## 2024-11-27 PROCEDURE — 1160F RVW MEDS BY RX/DR IN RCRD: CPT | Performed by: INTERNAL MEDICINE

## 2024-11-27 PROCEDURE — 1159F MED LIST DOCD IN RCRD: CPT | Performed by: INTERNAL MEDICINE

## 2024-11-27 PROCEDURE — 99213 OFFICE O/P EST LOW 20 MIN: CPT | Performed by: INTERNAL MEDICINE

## 2024-11-27 PROCEDURE — 36415 COLL VENOUS BLD VENIPUNCTURE: CPT

## 2024-11-27 RX ORDER — TRAMADOL HYDROCHLORIDE 50 MG/1
50 TABLET ORAL DAILY PRN
Qty: 30 TABLET | Refills: 0 | Status: SHIPPED | OUTPATIENT
Start: 2024-11-27

## 2024-11-27 RX ORDER — SPIRONOLACTONE 50 MG/1
25 TABLET, FILM COATED ORAL DAILY
Qty: 45 TABLET | Refills: 3 | Status: SHIPPED | OUTPATIENT
Start: 2024-11-27 | End: 2025-11-27

## 2024-11-27 ASSESSMENT — COLUMBIA-SUICIDE SEVERITY RATING SCALE - C-SSRS: 1. IN THE PAST MONTH, HAVE YOU WISHED YOU WERE DEAD OR WISHED YOU COULD GO TO SLEEP AND NOT WAKE UP?: NO

## 2024-11-27 ASSESSMENT — ENCOUNTER SYMPTOMS
OCCASIONAL FEELINGS OF UNSTEADINESS: 1
ARTHRALGIAS: 1
CONSTITUTIONAL NEGATIVE: 1
GASTROINTESTINAL NEGATIVE: 1
DEPRESSION: 0
LOSS OF SENSATION IN FEET: 0
DIZZINESS: 0
RESPIRATORY NEGATIVE: 1

## 2024-11-27 NOTE — PROGRESS NOTES
Subjective   Patient ID: Penelope A Leydig is a 79 y.o. female who presents for Follow-up.  HPI  Hypertension  This is a chronic problem. The current episode started more than 1 year ago. The problem has been resolved since onset. The problem is controlled. Pertinent negatives include no chest pain, headaches or shortness of breath. Past treatments include beta blockers and diuretics. There are no compliance problems.    Heart Problem  This is a recurrent (PSVT) problem. The current episode started more than 1 year ago. The problem occurs intermittently. The problem has been resolved. Associated symptoms include arthralgias. Pertinent negatives include no chest pain or headaches. The treatment provided significant relief.   Cirrhosis  Patient presents with no sympts.  Onset of symptoms was gradual starting several years ago with stable course since that time. Patient also notes stable pattern. Symptoms improve with none. Symptoms worsen with none. There is history of history of alcohol abuse former. There is no history of alcohol use. Past history includes ascites and cirrhosis. Previous studies include upper endoscopy.     Anemia  Presents for follow-up visit. Symptoms include bruises/bleeds easily and weight loss. There has been no abdominal pain, anorexia, light-headedness or palpitations. Signs of blood loss that are not present include melena. There are no compliance problems.  Side effects of medications include fatigue.   Past Medical History  History reviewed. No pertinent past medical history.    Social History  Social History     Tobacco Use    Smoking status: Former     Current packs/day: 0.00     Types: Cigarettes     Quit date: 1980     Years since quittin.9    Smokeless tobacco: Never   Vaping Use    Vaping status: Never Used   Substance Use Topics    Alcohol use: Not Currently    Drug use: Never       Family History     Family History   Problem Relation Name Age of Onset    Cancer Mother       Lung cancer Father      Heart attack Father         Allergies:  Allergies   Allergen Reactions    Iodinated Contrast Media Itching    Iodine Swelling and Itching     Lip swelling    Meperidine Other     IV--delirium/confusion    Other Other    Shellfish Containing Products Unknown        Outpatient Medications:  Current Outpatient Medications   Medication Instructions    famotidine (PEPCID) 20 mg, oral, 2 times daily    furosemide (LASIX) 20 mg, oral, Daily    Generlac 10 g, oral, 2 times daily    nadolol (CORGARD) 20 mg, oral, Nightly    spironolactone (ALDACTONE) 25 mg, oral, Daily    traMADol (ULTRAM) 50 mg, oral, Daily PRN        Review of Systems   Constitutional: Negative.    Respiratory: Negative.     Cardiovascular:  Positive for leg swelling.   Gastrointestinal: Negative.    Musculoskeletal:  Positive for arthralgias and gait problem.   Neurological:  Negative for dizziness.         Objective       Physical Exam  Vitals reviewed.   Constitutional:       Appearance: Normal appearance. She is normal weight.   HENT:      Head: Normocephalic.   Eyes:      Conjunctiva/sclera: Conjunctivae normal.   Cardiovascular:      Rate and Rhythm: Normal rate and regular rhythm.      Pulses: Normal pulses.      Heart sounds: Murmur heard.   Pulmonary:      Effort: Pulmonary effort is normal.      Breath sounds: Normal breath sounds.   Abdominal:      Palpations: Abdomen is soft.   Musculoskeletal:         General: Tenderness and deformity present.      Right lower leg: Edema present.      Left lower leg: Edema present.   Skin:     General: Skin is warm and dry.   Neurological:      General: No focal deficit present.     /68 (BP Location: Left arm, Patient Position: Sitting, BP Cuff Size: Adult)   Pulse (!) 126   Temp 35.2 °C (95.3 °F) (Temporal)   Wt 49.9 kg (110 lb)   BMI 28.60 kg/m²      Assessment/Plan   Problem List Items Addressed This Visit       Anemia of chronic disease    Cirrhosis (Multi) - Primary     Hiatal hernia    Paroxysmal SVT (supraventricular tachycardia) (CMS-HCC)    Hypertension   She needs alpha-fetoprotein, she has history of cirrhosis, today she was tachycardic, she is taking nadolol, she has history of SVT, she was seen by electrophysiology recently, I told her to take nadolol 20 mg extra as soon as she goes home, nadolol to be continued 20 mg every day if needed extra dose can be taken.  There is no decompensated features from cirrhosis of the liver.  Patient continue to remain on lactulose, Lasix, spironolactone.  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  She remains on famotidine, she has a huge hiatus hernia and [place] lesions are common in hiatus hernia.  Anemia persists, BP readings are stable, thigh pain and hip pain has been off and on, there was a fracture of the femur of the right side which has led to her chronic pain and tramadol is given because of that, patient has been seen by me for several years and ongoing care has been provided by me for several years for ongoing medical problems on a consistent basis,  was present today during encounter, follow-up in 3 months.

## 2025-02-09 DIAGNOSIS — M79.651 THIGH PAIN, MUSCULOSKELETAL, RIGHT: ICD-10-CM

## 2025-02-09 DIAGNOSIS — M25.551 PAIN OF RIGHT HIP: ICD-10-CM

## 2025-02-10 RX ORDER — TRAMADOL HYDROCHLORIDE 50 MG/1
50 TABLET ORAL DAILY
Qty: 30 TABLET | Refills: 0 | Status: SHIPPED | OUTPATIENT
Start: 2025-02-10

## 2025-02-12 ENCOUNTER — APPOINTMENT (OUTPATIENT)
Dept: PRIMARY CARE | Facility: CLINIC | Age: 80
End: 2025-02-12
Payer: MEDICARE

## 2025-02-12 VITALS
SYSTOLIC BLOOD PRESSURE: 127 MMHG | WEIGHT: 110 LBS | HEART RATE: 62 BPM | BODY MASS INDEX: 25.46 KG/M2 | TEMPERATURE: 96.6 F | HEIGHT: 55 IN | DIASTOLIC BLOOD PRESSURE: 61 MMHG

## 2025-02-12 DIAGNOSIS — D63.8 ANEMIA OF CHRONIC DISEASE: ICD-10-CM

## 2025-02-12 DIAGNOSIS — K76.6 PORTAL HYPERTENSION (MULTI): ICD-10-CM

## 2025-02-12 DIAGNOSIS — Z00.00 ROUTINE GENERAL MEDICAL EXAMINATION AT HEALTH CARE FACILITY: Primary | ICD-10-CM

## 2025-02-12 DIAGNOSIS — M05.79 RHEUMATOID ARTHRITIS INVOLVING MULTIPLE SITES WITH POSITIVE RHEUMATOID FACTOR (MULTI): ICD-10-CM

## 2025-02-12 PROCEDURE — 1160F RVW MEDS BY RX/DR IN RCRD: CPT | Performed by: INTERNAL MEDICINE

## 2025-02-12 PROCEDURE — 1036F TOBACCO NON-USER: CPT | Performed by: INTERNAL MEDICINE

## 2025-02-12 PROCEDURE — 1159F MED LIST DOCD IN RCRD: CPT | Performed by: INTERNAL MEDICINE

## 2025-02-12 PROCEDURE — 3078F DIAST BP <80 MM HG: CPT | Performed by: INTERNAL MEDICINE

## 2025-02-12 PROCEDURE — G0439 PPPS, SUBSEQ VISIT: HCPCS | Performed by: INTERNAL MEDICINE

## 2025-02-12 PROCEDURE — 3074F SYST BP LT 130 MM HG: CPT | Performed by: INTERNAL MEDICINE

## 2025-02-12 PROCEDURE — 1123F ACP DISCUSS/DSCN MKR DOCD: CPT | Performed by: INTERNAL MEDICINE

## 2025-02-12 PROCEDURE — 1170F FXNL STATUS ASSESSED: CPT | Performed by: INTERNAL MEDICINE

## 2025-02-12 PROCEDURE — 1157F ADVNC CARE PLAN IN RCRD: CPT | Performed by: INTERNAL MEDICINE

## 2025-02-12 ASSESSMENT — ACTIVITIES OF DAILY LIVING (ADL)
BATHING: NEEDS ASSISTANCE
ADEQUATE_TO_COMPLETE_ADL: YES
HEARING - LEFT EAR: FUNCTIONAL
FEEDING YOURSELF: INDEPENDENT
PATIENT'S MEMORY ADEQUATE TO SAFELY COMPLETE DAILY ACTIVITIES?: YES
NEEDS ASSISTANCE WITH FOOD: SET UP OF PLATE
PILL BOX USED: NO
GROCERY SHOPPING: NEEDS ASSISTANCE
ASSISTIVE_DEVICE: WHEELCHAIR;CANE
TOILETING: INDEPENDENT
HEARING - RIGHT EAR: FUNCTIONAL
USING TELEPHONE: INDEPENDENT
WALKS IN HOME: NEEDS ASSISTANCE
USING TRANSPORTATION: TOTAL CARE
STIL DRIVING: NO
MANAGING FINANCES: NEEDS ASSISTANCE
DOING HOUSEWORK: NEEDS ASSISTANCE
JUDGMENT_ADEQUATE_SAFELY_COMPLETE_DAILY_ACTIVITIES: YES
PREPARING MEALS: NEEDS ASSISTANCE
GROOMING: NEEDS ASSISTANCE
EATING: INDEPENDENT
DRESSING YOURSELF: NEEDS ASSISTANCE

## 2025-02-12 ASSESSMENT — ENCOUNTER SYMPTOMS
DIZZINESS: 0
AGITATION: 0
LOSS OF SENSATION IN FEET: 0
RESPIRATORY NEGATIVE: 1
CONFUSION: 0
ARTHRALGIAS: 1
DEPRESSION: 0
CARDIOVASCULAR NEGATIVE: 1
OCCASIONAL FEELINGS OF UNSTEADINESS: 0
BACK PAIN: 1
GASTROINTESTINAL NEGATIVE: 1
CONSTITUTIONAL NEGATIVE: 1

## 2025-02-12 ASSESSMENT — PATIENT HEALTH QUESTIONNAIRE - PHQ9
SUM OF ALL RESPONSES TO PHQ9 QUESTIONS 1 AND 2: 0
SUM OF ALL RESPONSES TO PHQ9 QUESTIONS 1 AND 2: 0
2. FEELING DOWN, DEPRESSED OR HOPELESS: NOT AT ALL
1. LITTLE INTEREST OR PLEASURE IN DOING THINGS: NOT AT ALL
2. FEELING DOWN, DEPRESSED OR HOPELESS: NOT AT ALL
1. LITTLE INTEREST OR PLEASURE IN DOING THINGS: NOT AT ALL

## 2025-02-12 ASSESSMENT — COLUMBIA-SUICIDE SEVERITY RATING SCALE - C-SSRS
6. HAVE YOU EVER DONE ANYTHING, STARTED TO DO ANYTHING, OR PREPARED TO DO ANYTHING TO END YOUR LIFE?: NO
2. HAVE YOU ACTUALLY HAD ANY THOUGHTS OF KILLING YOURSELF?: NO
1. IN THE PAST MONTH, HAVE YOU WISHED YOU WERE DEAD OR WISHED YOU COULD GO TO SLEEP AND NOT WAKE UP?: NO

## 2025-02-12 NOTE — PROGRESS NOTES
Subjective   Reason for Visit: Penelope A Leydig is an 79 y.o. female here for a Medicare Wellness visit.     Past Medical, Surgical, and Family History reviewed and updated in chart.    Reviewed all medications by prescribing practitioner or clinical pharmacist (such as prescriptions, OTCs, herbal therapies and supplements) and documented in the medical record.    79-year-old female patient was seen for wellness exam.  She is very well taken care by  and the daughter, both were present.  She has a very well compensated liver disease for more than 7 years and actually she has done very well, there is no ascites, there is no GI bleeding, there is no encephalopathy, it is a alcoholic liver disease, she has not fallen recently, she is on tramadol because of previous osteomyelitis of spine, also she is on Generlac, nadolol, spironolactone.  She has limited activities, she has a DNR and living will, some of the vaccinations are up to date, she understands the nature and severity of her liver disease.  Her last CBC and platelet counts were reviewed, she has not have any ER visit, they are trying that best to prevent any falls, excellent support is present from family, she is basically homebound, no new concerns identified, she has SVT, nadolol is given for SVT, nadolol does not cause nightmares.      OARRS:  No data recorded  I have personally reviewed the OARRS report for Penelope A Leydig. I have considered the risks of abuse, dependence, addiction and diversion    Is the patient prescribed a combination of a benzodiazepine and opioid?  No    Last Urine Drug Screen / ordered today: Yes  No results found for this or any previous visit (from the past 8760 hours).  N/A    Clinical rationale for not completing a Urine Drug Screen: not needed      Controlled Substance Agreement:  Date of the Last Agreement: 5/28/2024  Reviewed Controlled Substance Agreement including but not limited to the benefits, risks, and  "alternatives to treatment with a Controlled Substance medication(s).    Opioids:  What is the patient's goal of therapy? Less pain  Is this being achieved with current treatment? yes    I have calculated the patient's Morphine Dose Equivalent (MED):   I have considered referral to Pain Management and/or a specialist, and do not feel it is necessary at this time.    I feel that it is clinically indicated to continue this current medication regimen after consideration of alternative therapies, and other non-opioid treatment.    Opioid Risk Screening:  No data recorded    Pain Assessment:  No data recorded  Patient Care Team:  Jose Hall MD as PCP - General  Jose Hall MD as PCP - [employer] PCP  Ariel Mccoy MD as Cardiologist (Electrophysiology)     Review of Systems   Constitutional: Negative.    Respiratory: Negative.     Cardiovascular: Negative.    Gastrointestinal: Negative.    Musculoskeletal:  Positive for arthralgias and back pain.   Neurological:  Negative for dizziness.   Psychiatric/Behavioral:  Negative for agitation, behavioral problems and confusion.        Objective   Vitals:  /61 (BP Location: Left arm, Patient Position: Sitting, BP Cuff Size: Adult)   Pulse 62   Temp 35.9 °C (96.6 °F) (Temporal)   Ht 1.321 m (4' 4\")   Wt 49.9 kg (110 lb)   BMI 28.60 kg/m²       Physical Exam  Constitutional:       Appearance: Normal appearance. She is normal weight.   Eyes:      Conjunctiva/sclera: Conjunctivae normal.   Cardiovascular:      Rate and Rhythm: Normal rate and regular rhythm.      Pulses: Normal pulses.      Heart sounds: Normal heart sounds.   Pulmonary:      Effort: Pulmonary effort is normal.      Breath sounds: Normal breath sounds.   Abdominal:      Palpations: Abdomen is soft.   Musculoskeletal:         General: Tenderness and deformity present. No swelling.      Cervical back: Neck supple.   Skin:     General: Skin is warm and dry.   Neurological:      " General: No focal deficit present.      Mental Status: She is oriented to person, place, and time. Mental status is at baseline.         Assessment & Plan  Routine general medical examination at health care facility    Orders:    1 Year Follow Up In Primary Care - Wellness Exam; Future    Portal hypertension (Multi)    Orders:    CBC and Auto Differential; Future    Rheumatoid arthritis involving multiple sites with positive rheumatoid factor (Multi)       OARRS reviewed, UDS has been done on annual basis. Rx patten and usage reviewed, adverse reactions to controlled substance and risk of habituality and addiction and overdose and death explained, pt has been explained about [facility] controlled substance policy and mandates and follow ups. Controlled substance agreement has been reviewed and renewed annually. Please keep bottles containing controlled substance medications away from children and in safe area, do not share this Rx to anyone, noncompliance will lead to cessation of therapy. So far patient has been doing well and shows compliance.  Tramadol therapy was reviewed, OARRS report has been reviewed on every renewal, tramadol has been given because of osteomyelitis of the spine which has been causing chronic back pain, mild leg swelling present, no ascites, physical exam is otherwise unremarkable or unchanged compared to last time.  She is careful with her salt intake, she has a limited mobility, no cognitive impairment, no depression.  She will continue medications as listed, CBC will be checked today, home conditions are excellent, living will is up to date, she sleeps adequately, no confusion or disorientation or depression or apathy or anhedonia.  Follow-up in 3 months.

## 2025-02-13 LAB
BASOPHILS # BLD AUTO: 41 CELLS/UL (ref 0–200)
BASOPHILS NFR BLD AUTO: 1.1 %
EOSINOPHIL # BLD AUTO: 163 CELLS/UL (ref 15–500)
EOSINOPHIL NFR BLD AUTO: 4.4 %
ERYTHROCYTE [DISTWIDTH] IN BLOOD BY AUTOMATED COUNT: 14.1 % (ref 11–15)
HCT VFR BLD AUTO: 31.3 % (ref 35–45)
HGB BLD-MCNC: 9.6 G/DL (ref 11.7–15.5)
LYMPHOCYTES # BLD AUTO: 496 CELLS/UL (ref 850–3900)
LYMPHOCYTES NFR BLD AUTO: 13.4 %
MCH RBC QN AUTO: 26.8 PG (ref 27–33)
MCHC RBC AUTO-ENTMCNC: 30.7 G/DL (ref 32–36)
MCV RBC AUTO: 87.4 FL (ref 80–100)
MONOCYTES # BLD AUTO: 466 CELLS/UL (ref 200–950)
MONOCYTES NFR BLD AUTO: 12.6 %
NEUTROPHILS # BLD AUTO: 2535 CELLS/UL (ref 1500–7800)
NEUTROPHILS NFR BLD AUTO: 68.5 %
PLATELET # BLD AUTO: 83 THOUSAND/UL (ref 140–400)
PMV BLD REES-ECKER: 10.6 FL (ref 7.5–12.5)
RBC # BLD AUTO: 3.58 MILLION/UL (ref 3.8–5.1)
WBC # BLD AUTO: 3.7 THOUSAND/UL (ref 3.8–10.8)

## 2025-04-08 DIAGNOSIS — M25.551 PAIN OF RIGHT HIP: ICD-10-CM

## 2025-04-08 DIAGNOSIS — M79.651 THIGH PAIN, MUSCULOSKELETAL, RIGHT: ICD-10-CM

## 2025-04-14 RX ORDER — TRAMADOL HYDROCHLORIDE 50 MG/1
50 TABLET ORAL DAILY
Qty: 30 TABLET | Refills: 0 | Status: SHIPPED | OUTPATIENT
Start: 2025-04-14

## 2025-05-14 ENCOUNTER — APPOINTMENT (OUTPATIENT)
Dept: PRIMARY CARE | Facility: CLINIC | Age: 80
End: 2025-05-14
Payer: MEDICARE

## 2025-05-14 VITALS
HEIGHT: 55 IN | TEMPERATURE: 95.9 F | HEART RATE: 65 BPM | SYSTOLIC BLOOD PRESSURE: 119 MMHG | DIASTOLIC BLOOD PRESSURE: 78 MMHG | WEIGHT: 110 LBS | BODY MASS INDEX: 25.46 KG/M2

## 2025-05-14 DIAGNOSIS — K44.9 HIATAL HERNIA: ICD-10-CM

## 2025-05-14 DIAGNOSIS — K70.30 ALCOHOLIC CIRRHOSIS OF LIVER WITHOUT ASCITES (MULTI): ICD-10-CM

## 2025-05-14 DIAGNOSIS — M19.90 OSTEOARTHRITIS, UNSPECIFIED OSTEOARTHRITIS TYPE, UNSPECIFIED SITE: ICD-10-CM

## 2025-05-14 DIAGNOSIS — D63.8 ANEMIA OF CHRONIC DISEASE: ICD-10-CM

## 2025-05-14 DIAGNOSIS — I10 PRIMARY HYPERTENSION: ICD-10-CM

## 2025-05-14 DIAGNOSIS — I47.10 PAROXYSMAL SVT (SUPRAVENTRICULAR TACHYCARDIA): Primary | ICD-10-CM

## 2025-05-14 PROCEDURE — 1159F MED LIST DOCD IN RCRD: CPT | Performed by: INTERNAL MEDICINE

## 2025-05-14 PROCEDURE — 99213 OFFICE O/P EST LOW 20 MIN: CPT | Performed by: INTERNAL MEDICINE

## 2025-05-14 PROCEDURE — G2211 COMPLEX E/M VISIT ADD ON: HCPCS | Performed by: INTERNAL MEDICINE

## 2025-05-14 PROCEDURE — 1036F TOBACCO NON-USER: CPT | Performed by: INTERNAL MEDICINE

## 2025-05-14 PROCEDURE — 3074F SYST BP LT 130 MM HG: CPT | Performed by: INTERNAL MEDICINE

## 2025-05-14 PROCEDURE — 3078F DIAST BP <80 MM HG: CPT | Performed by: INTERNAL MEDICINE

## 2025-05-14 RX ORDER — TRAMADOL HYDROCHLORIDE 50 MG/1
50 TABLET ORAL DAILY
Qty: 30 TABLET | Refills: 0 | Status: CANCELLED | OUTPATIENT
Start: 2025-05-14

## 2025-05-14 ASSESSMENT — ENCOUNTER SYMPTOMS
LOSS OF SENSATION IN FEET: 0
ARTHRALGIAS: 1
ABDOMINAL DISTENTION: 0
DECREASED CONCENTRATION: 0
BLOOD IN STOOL: 0
VOMITING: 0
DIZZINESS: 0
NAUSEA: 0
WEAKNESS: 0
CONSTITUTIONAL NEGATIVE: 1
CARDIOVASCULAR NEGATIVE: 1
ABDOMINAL PAIN: 0
OCCASIONAL FEELINGS OF UNSTEADINESS: 0
RESPIRATORY NEGATIVE: 1
DEPRESSION: 0

## 2025-05-14 ASSESSMENT — PATIENT HEALTH QUESTIONNAIRE - PHQ9
1. LITTLE INTEREST OR PLEASURE IN DOING THINGS: NOT AT ALL
SUM OF ALL RESPONSES TO PHQ9 QUESTIONS 1 AND 2: 0
2. FEELING DOWN, DEPRESSED OR HOPELESS: NOT AT ALL

## 2025-05-14 NOTE — PROGRESS NOTES
Subjective   Patient ID: Penelope A Leydig is a 79 y.o. female who presents for Follow-up (3 mo fu).  HPI  Hypertension  This is a chronic problem. The current episode started more than 1 year ago. The problem has been resolved since onset. The problem is controlled. Pertinent negatives include no chest pain, headaches or shortness of breath. Past treatments include beta blockers and diuretics. There are no compliance problems.    Heart Problem  This is a recurrent (PSVT) problem. The current episode started more than 1 year ago. The problem occurs intermittently. The problem has been resolved. Associated symptoms include arthralgias. Pertinent negatives include no chest pain or headaches. The treatment provided significant relief.   Cirrhosis  Patient presents with no sympts.  Onset of symptoms was gradual starting several years ago with stable course since that time. Patient also notes stable pattern. Symptoms improve with none. Symptoms worsen with none. There is history of history of alcohol abuse former. There is no history of alcohol use. Past history includes ascites and cirrhosis. Previous studies include upper endoscopy.   OARRS:  Jose Hall MD on 5/14/2025  1:08 PM  I have personally reviewed the OARRS report for Penelope A Leydig. I have considered the risks of abuse, dependence, addiction and diversion    Is the patient prescribed a combination of a benzodiazepine and opioid?  No    Last Urine Drug Screen / ordered today: No  No results found for this or any previous visit (from the past 8760 hours).  N/A    Clinical rationale for not completing a Urine Drug Screen: not necessary      Controlled Substance Agreement:  Date of the Last Agreement: 5/14/2025  Reviewed Controlled Substance Agreement including but not limited to the benefits, risks, and alternatives to treatment with a Controlled Substance medication(s).    Opioids:  What is the patient's goal of therapy? Less pain  Is this being achieved  with current treatment? yes    I have calculated the patient's Morphine Dose Equivalent (MED):   I have considered referral to Pain Management and/or a specialist, and do not feel it is necessary at this time.    I feel that it is clinically indicated to continue this current medication regimen after consideration of alternative therapies, and other non-opioid treatment.    Opioid Risk Screening:  No data recorded    Pain Assessment:  No data recorded  Anemia  Presents for follow-up visit. Symptoms include bruises/bleeds easily and weight loss. There has been no abdominal pain, anorexia, light-headedness or palpitations. Signs of blood loss that are not present include melena. There are no compliance problems.   Past Medical History  Medical History[1]    Social History  Social History[2]    Family History   Family History[3]    Allergies:  RX Allergies[4]     Outpatient Medications:  Current Outpatient Medications   Medication Instructions    famotidine (PEPCID) 20 mg, oral, 2 times daily    furosemide (LASIX) 20 mg, oral, Daily    Generlac 10 g, oral, 2 times daily    nadolol (CORGARD) 20 mg, oral, Nightly    spironolactone (ALDACTONE) 25 mg, oral, Daily    traMADol (ULTRAM) 50 mg, oral, Daily        Review of Systems   Constitutional: Negative.    Respiratory: Negative.     Cardiovascular: Negative.    Gastrointestinal:  Negative for abdominal distention, abdominal pain, blood in stool, nausea and vomiting.   Musculoskeletal:  Positive for arthralgias.   Skin: Negative.    Neurological:  Negative for dizziness and weakness.   Psychiatric/Behavioral:  Negative for decreased concentration.          Objective       Physical Exam  Vitals reviewed.   Constitutional:       Appearance: Normal appearance. She is normal weight.   HENT:      Head: Normocephalic.   Eyes:      Conjunctiva/sclera: Conjunctivae normal.   Cardiovascular:      Rate and Rhythm: Normal rate and regular rhythm.      Pulses: Normal pulses.  "  Pulmonary:      Effort: Pulmonary effort is normal.      Breath sounds: Normal breath sounds.   Abdominal:      Palpations: Abdomen is soft.      Hernia: A hernia is present.   Musculoskeletal:         General: Tenderness and deformity present. Normal range of motion.      Cervical back: Neck supple.      Right lower leg: Edema present.      Left lower leg: Edema present.   Skin:     General: Skin is warm and dry.   Neurological:      Mental Status: Mental status is at baseline.   Psychiatric:         Behavior: Behavior normal.     /78 (BP Location: Left arm, Patient Position: Sitting, BP Cuff Size: Adult)   Pulse 65   Temp 35.5 °C (95.9 °F) (Temporal)   Ht 1.321 m (4' 4\")   Wt 49.9 kg (110 lb)   BMI 28.60 kg/m²      Assessment/Plan   Problem List Items Addressed This Visit       Anemia of chronic disease    Cirrhosis (Multi)    Relevant Orders    CBC and Auto Differential    Comprehensive Metabolic Panel    Protime-INR    Hiatal hernia    Paroxysmal SVT (supraventricular tachycardia) (CMS-HCC) - Primary    OA (osteoarthritis)    Hypertension   Patient was identified as a fall risk. Risk prevention instructions provided. OARs reviewed today..  Quick Tips   ? Ask for help if you need it. Most people want to help!   ? Get up slowly after sitting or laying down   ? Wear a medical alert device or keep cell phone in your pocket   ? Use night lights, especially areas near a bathroom   ? Keep the items you use often within reach on a small stool or end table   ? Use an assistive device such as walker or cane, as directed by provider/physical therapy   ? Use a non-slip mat and grab bars in your bathroom. Look for home health sections for best options     Other Areas to Focus On   ? Exercise and nutrition: Regular exercise or taking a falls prevention class are great ways improve strength and balance. Don’t forget to stay hydrated and bring a snack!   ? Medicine side effects: Some medicines can make you sleepy " or dizzy, which could cause a fall. Ask your healthcare provider about the side effects your medicines could cause. Be sure to let them know if you take any vitamins or supplements as well.   ? Tripping hazards: Remove items you could trip on, such as loose mats, rugs, cords, and clutter. Wear closed toe shoes with rubber soles.   ? Health and wellness: Get regular checkups with your healthcare provider, plus routine vision and hearing screenings. Talk with your healthcare provider about:   o Your medicines and the possible side effects - bring them in a bag if that is easier!   o Problems with balance or feeling dizzy   o Ways to promote bone health, such as Vitamin D and calcium supplements   o Questions or concerns about falling   Patient was accompanied by daughter and .  Stable condition, cirrhosis of liver well compensated, there is no alcoholic neuropathy, she has a deformed joints from rheumatoid arthritis it has been left alone, she will require controlled substance agreement sometimes, she will require ongoing tramadol treatment which she has been using it for pain and discomfort.  CBC will be checked, potassium will be checked, INR will be checked, controlled substance agreement will be signed today if we can.  No SVT's, 1 fall, previously has a vertebral osteomyelitis, no encephalopathy, thrombocytopenia persist.  Anemia persists, she has a huge hiatal hernia which could actually cause the bleeding spots or erosions.  Stable condition she is about to be 80-year-old female patient, fall precautions reviewed, patient's care has been provided by me on a longitudinal basis with management of the chronic problems and if any acute problem arise here at focal point of care here in our office.       [1] History reviewed. No pertinent past medical history.  [2]   Social History  Tobacco Use    Smoking status: Former     Current packs/day: 0.00     Types: Cigarettes     Quit date: 1/1/1980     Years since  quittin.3    Smokeless tobacco: Never   Vaping Use    Vaping status: Never Used   Substance Use Topics    Alcohol use: Not Currently    Drug use: Never   [3]   Family History  Problem Relation Name Age of Onset    Cancer Mother      Lung cancer Father      Heart attack Father     [4]   Allergies  Allergen Reactions    Iodinated Contrast Media Itching    Iodine Swelling and Itching     Lip swelling    Meperidine Other     IV--delirium/confusion    Other Other    Shellfish Containing Products Unknown

## 2025-05-15 LAB
ALBUMIN SERPL-MCNC: 4 G/DL (ref 3.6–5.1)
ALP SERPL-CCNC: 62 U/L (ref 37–153)
ALT SERPL-CCNC: 11 U/L (ref 6–29)
ANION GAP SERPL CALCULATED.4IONS-SCNC: 9 MMOL/L (CALC) (ref 7–17)
AST SERPL-CCNC: 22 U/L (ref 10–35)
BASOPHILS # BLD AUTO: 59 CELLS/UL (ref 0–200)
BASOPHILS NFR BLD AUTO: 1.5 %
BILIRUB SERPL-MCNC: 1.5 MG/DL (ref 0.2–1.2)
BUN SERPL-MCNC: 32 MG/DL (ref 7–25)
CALCIUM SERPL-MCNC: 9.3 MG/DL (ref 8.6–10.4)
CHLORIDE SERPL-SCNC: 110 MMOL/L (ref 98–110)
CO2 SERPL-SCNC: 23 MMOL/L (ref 20–32)
CREAT SERPL-MCNC: 0.98 MG/DL (ref 0.6–1)
EGFRCR SERPLBLD CKD-EPI 2021: 59 ML/MIN/1.73M2
EOSINOPHIL # BLD AUTO: 78 CELLS/UL (ref 15–500)
EOSINOPHIL NFR BLD AUTO: 2 %
ERYTHROCYTE [DISTWIDTH] IN BLOOD BY AUTOMATED COUNT: 14.9 % (ref 11–15)
GLUCOSE SERPL-MCNC: 122 MG/DL (ref 65–139)
HCT VFR BLD AUTO: 33.2 % (ref 35–45)
HGB BLD-MCNC: 10.2 G/DL (ref 11.7–15.5)
INR PPP: 1.2
LYMPHOCYTES # BLD AUTO: 577 CELLS/UL (ref 850–3900)
LYMPHOCYTES NFR BLD AUTO: 14.8 %
MCH RBC QN AUTO: 28 PG (ref 27–33)
MCHC RBC AUTO-ENTMCNC: 30.7 G/DL (ref 32–36)
MCV RBC AUTO: 91.2 FL (ref 80–100)
MONOCYTES # BLD AUTO: 308 CELLS/UL (ref 200–950)
MONOCYTES NFR BLD AUTO: 7.9 %
NEUTROPHILS # BLD AUTO: 2878 CELLS/UL (ref 1500–7800)
NEUTROPHILS NFR BLD AUTO: 73.8 %
PLATELET # BLD AUTO: 73 THOUSAND/UL (ref 140–400)
PMV BLD REES-ECKER: 10.5 FL (ref 7.5–12.5)
POTASSIUM SERPL-SCNC: 4.9 MMOL/L (ref 3.5–5.3)
PROT SERPL-MCNC: 6.2 G/DL (ref 6.1–8.1)
PROTHROMBIN TIME: 12.2 SEC (ref 9–11.5)
RBC # BLD AUTO: 3.64 MILLION/UL (ref 3.8–5.1)
SERVICE CMNT-IMP: ABNORMAL
SODIUM SERPL-SCNC: 142 MMOL/L (ref 135–146)
WBC # BLD AUTO: 3.9 THOUSAND/UL (ref 3.8–10.8)

## 2025-06-07 DIAGNOSIS — M25.551 PAIN OF RIGHT HIP: ICD-10-CM

## 2025-06-07 DIAGNOSIS — M79.651 THIGH PAIN, MUSCULOSKELETAL, RIGHT: ICD-10-CM

## 2025-06-07 RX ORDER — TRAMADOL HYDROCHLORIDE 50 MG/1
50 TABLET, FILM COATED ORAL DAILY
Qty: 30 TABLET | Refills: 0 | Status: SHIPPED | OUTPATIENT
Start: 2025-06-07

## 2025-06-24 DIAGNOSIS — R60.0 EDEMA OF LOWER EXTREMITY: ICD-10-CM

## 2025-06-24 RX ORDER — FUROSEMIDE 20 MG/1
20 TABLET ORAL DAILY
Qty: 30 TABLET | Refills: 3 | Status: SHIPPED | OUTPATIENT
Start: 2025-06-24

## 2025-08-08 DIAGNOSIS — M25.551 PAIN OF RIGHT HIP: ICD-10-CM

## 2025-08-08 DIAGNOSIS — M79.651 THIGH PAIN, MUSCULOSKELETAL, RIGHT: ICD-10-CM

## 2025-08-08 RX ORDER — TRAMADOL HYDROCHLORIDE 50 MG/1
50 TABLET, FILM COATED ORAL DAILY
Qty: 30 TABLET | Refills: 0 | Status: SHIPPED | OUTPATIENT
Start: 2025-08-08

## 2025-08-11 ENCOUNTER — APPOINTMENT (OUTPATIENT)
Dept: PRIMARY CARE | Facility: CLINIC | Age: 80
End: 2025-08-11
Payer: MEDICARE

## 2025-08-11 VITALS
BODY MASS INDEX: 27.31 KG/M2 | WEIGHT: 118 LBS | HEIGHT: 55 IN | DIASTOLIC BLOOD PRESSURE: 67 MMHG | HEART RATE: 89 BPM | TEMPERATURE: 97.8 F | SYSTOLIC BLOOD PRESSURE: 110 MMHG

## 2025-08-11 DIAGNOSIS — S80.01XA CONTUSION OF RIGHT KNEE AND LOWER LEG, INITIAL ENCOUNTER: ICD-10-CM

## 2025-08-11 DIAGNOSIS — S51.802A OPEN WOUND OF LEFT FOREARM, INITIAL ENCOUNTER: Primary | ICD-10-CM

## 2025-08-11 DIAGNOSIS — S80.11XA CONTUSION OF RIGHT KNEE AND LOWER LEG, INITIAL ENCOUNTER: ICD-10-CM

## 2025-08-11 PROCEDURE — 3074F SYST BP LT 130 MM HG: CPT | Performed by: INTERNAL MEDICINE

## 2025-08-11 PROCEDURE — 99213 OFFICE O/P EST LOW 20 MIN: CPT | Performed by: INTERNAL MEDICINE

## 2025-08-11 PROCEDURE — 3078F DIAST BP <80 MM HG: CPT | Performed by: INTERNAL MEDICINE

## 2025-08-11 PROCEDURE — 1159F MED LIST DOCD IN RCRD: CPT | Performed by: INTERNAL MEDICINE

## 2025-08-11 ASSESSMENT — ENCOUNTER SYMPTOMS
TINGLING: 0
WOUND: 1
COUGH: 0
OCCASIONAL FEELINGS OF UNSTEADINESS: 1
NEUROLOGICAL NEGATIVE: 1
LOSS OF SENSATION IN FEET: 0
DEPRESSION: 0
ARTHRALGIAS: 1
COLOR CHANGE: 1
SHORTNESS OF BREATH: 0
NUMBNESS: 0
GASTROINTESTINAL NEGATIVE: 1
BACK PAIN: 1
ABDOMINAL PAIN: 0
CONSTITUTIONAL NEGATIVE: 1

## 2025-08-11 ASSESSMENT — COLUMBIA-SUICIDE SEVERITY RATING SCALE - C-SSRS
1. IN THE PAST MONTH, HAVE YOU WISHED YOU WERE DEAD OR WISHED YOU COULD GO TO SLEEP AND NOT WAKE UP?: NO
2. HAVE YOU ACTUALLY HAD ANY THOUGHTS OF KILLING YOURSELF?: NO
6. HAVE YOU EVER DONE ANYTHING, STARTED TO DO ANYTHING, OR PREPARED TO DO ANYTHING TO END YOUR LIFE?: NO

## 2025-08-15 ENCOUNTER — PREP FOR PROCEDURE (OUTPATIENT)
Dept: SURGERY | Facility: HOSPITAL | Age: 80
End: 2025-08-15
Payer: MEDICARE

## 2025-08-20 ENCOUNTER — APPOINTMENT (OUTPATIENT)
Dept: WOUND CARE | Facility: CLINIC | Age: 80
End: 2025-08-20
Payer: MEDICARE

## 2025-08-20 ENCOUNTER — APPOINTMENT (OUTPATIENT)
Dept: PRIMARY CARE | Facility: CLINIC | Age: 80
End: 2025-08-20
Payer: MEDICARE

## 2025-11-04 ENCOUNTER — APPOINTMENT (OUTPATIENT)
Dept: CARDIOLOGY | Facility: CLINIC | Age: 80
End: 2025-11-04
Payer: MEDICARE

## 2025-11-17 ENCOUNTER — APPOINTMENT (OUTPATIENT)
Dept: PRIMARY CARE | Facility: CLINIC | Age: 80
End: 2025-11-17
Payer: MEDICARE